# Patient Record
Sex: MALE | Race: WHITE | Employment: OTHER | URBAN - METROPOLITAN AREA
[De-identification: names, ages, dates, MRNs, and addresses within clinical notes are randomized per-mention and may not be internally consistent; named-entity substitution may affect disease eponyms.]

---

## 2018-07-17 ENCOUNTER — APPOINTMENT (OUTPATIENT)
Dept: ULTRASOUND IMAGING | Age: 78
DRG: 564 | End: 2018-07-17
Payer: MEDICARE

## 2018-07-17 ENCOUNTER — APPOINTMENT (OUTPATIENT)
Dept: GENERAL RADIOLOGY | Age: 78
DRG: 564 | End: 2018-07-17
Payer: MEDICARE

## 2018-07-17 ENCOUNTER — APPOINTMENT (OUTPATIENT)
Dept: CT IMAGING | Age: 78
DRG: 564 | End: 2018-07-17
Payer: MEDICARE

## 2018-07-17 ENCOUNTER — HOSPITAL ENCOUNTER (INPATIENT)
Age: 78
LOS: 6 days | Discharge: NURSING, REHAB OR LONG-TERM CARE | DRG: 564 | End: 2018-07-23
Attending: EMERGENCY MEDICINE | Admitting: INTERNAL MEDICINE
Payer: MEDICARE

## 2018-07-17 DIAGNOSIS — R94.31 PROLONGED Q-T INTERVAL ON ECG: ICD-10-CM
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
## 2018-07-17 DIAGNOSIS — T79.6XXA TRAUMATIC RHABDOMYOLYSIS, INITIAL ENCOUNTER (HCC): Primary | ICD-10-CM

## 2018-07-17 DIAGNOSIS — R41.82 ALTERED MENTAL STATUS, UNSPECIFIED ALTERED MENTAL STATUS TYPE: ICD-10-CM

## 2018-07-17 DIAGNOSIS — R55 SYNCOPE AND COLLAPSE: ICD-10-CM

## 2018-07-17 LAB
ACETAMINOPHEN LEVEL: <5 MCG/ML (ref 10–30)
ALBUMIN SERPL-MCNC: 3.3 G/DL (ref 3.5–5.2)
ALP BLD-CCNC: 87 U/L (ref 40–129)
ALT SERPL-CCNC: 29 U/L (ref 0–40)
AMPHETAMINE SCREEN, URINE: NOT DETECTED
ANION GAP SERPL CALCULATED.3IONS-SCNC: 12 MMOL/L (ref 7–16)
AST SERPL-CCNC: 59 U/L (ref 0–39)
B.E.: 0.2 MMOL/L (ref -3–3)
BACTERIA: NORMAL /HPF
BARBITURATE SCREEN URINE: NOT DETECTED
BASOPHILS ABSOLUTE: 0.01 E9/L (ref 0–0.2)
BASOPHILS RELATIVE PERCENT: 0.1 % (ref 0–2)
BENZODIAZEPINE SCREEN, URINE: NOT DETECTED
BILIRUB SERPL-MCNC: 0.7 MG/DL (ref 0–1.2)
BILIRUBIN URINE: ABNORMAL
BLOOD, URINE: ABNORMAL
BUN BLDV-MCNC: 24 MG/DL (ref 8–23)
CALCIUM SERPL-MCNC: 8.7 MG/DL (ref 8.6–10.2)
CANNABINOID SCREEN URINE: NOT DETECTED
CHLORIDE BLD-SCNC: 107 MMOL/L (ref 98–107)
CHP ED QC CHECK: NORMAL
CK MB: 13.5 NG/ML (ref 0–7.7)
CK MB: 17.8 NG/ML (ref 0–7.7)
CLARITY: CLEAR
CO2: 25 MMOL/L (ref 22–29)
COCAINE METABOLITE SCREEN URINE: NOT DETECTED
COHB: 0.8 % (ref 0–1.5)
COLOR: YELLOW
COMMENT: ABNORMAL
CREAT SERPL-MCNC: 1.1 MG/DL (ref 0.7–1.2)
CRITICAL: ABNORMAL
DATE ANALYZED: ABNORMAL
DATE OF COLLECTION: ABNORMAL
EOSINOPHILS ABSOLUTE: 0 E9/L (ref 0.05–0.5)
EOSINOPHILS RELATIVE PERCENT: 0 % (ref 0–6)
ETHANOL: <10 MG/DL (ref 0–0.08)
GFR AFRICAN AMERICAN: >60
GFR NON-AFRICAN AMERICAN: >60 ML/MIN/1.73
GLUCOSE BLD-MCNC: 110 MG/DL
GLUCOSE BLD-MCNC: 116 MG/DL (ref 74–109)
GLUCOSE URINE: NEGATIVE MG/DL
HCO3: 27 MMOL/L (ref 22–26)
HCT VFR BLD CALC: 35.5 % (ref 37–54)
HEMOGLOBIN: 11.8 G/DL (ref 12.5–16.5)
HHB: 51.8 % (ref 0–5)
IMMATURE GRANULOCYTES #: 0.08 E9/L
IMMATURE GRANULOCYTES %: 0.7 % (ref 0–5)
KETONES, URINE: 15 MG/DL
LAB: ABNORMAL
LACTIC ACID: 1.2 MMOL/L (ref 0.5–2.2)
LEUKOCYTE ESTERASE, URINE: NEGATIVE
LYMPHOCYTES ABSOLUTE: 0.64 E9/L (ref 1.5–4)
LYMPHOCYTES RELATIVE PERCENT: 5.8 % (ref 20–42)
Lab: 1045
MAGNESIUM: 2.3 MG/DL (ref 1.6–2.6)
MCH RBC QN AUTO: 30.6 PG (ref 26–35)
MCHC RBC AUTO-ENTMCNC: 33.2 % (ref 32–34.5)
MCV RBC AUTO: 92.2 FL (ref 80–99.9)
METER GLUCOSE: 110 MG/DL (ref 70–110)
METER GLUCOSE: 93 MG/DL (ref 70–110)
METHADONE SCREEN, URINE: NOT DETECTED
METHB: 0.5 % (ref 0–1.5)
MODE: ABNORMAL
MONOCYTES ABSOLUTE: 0.72 E9/L (ref 0.1–0.95)
MONOCYTES RELATIVE PERCENT: 6.5 % (ref 2–12)
NEUTROPHILS ABSOLUTE: 9.65 E9/L (ref 1.8–7.3)
NEUTROPHILS RELATIVE PERCENT: 86.9 % (ref 43–80)
NITRITE, URINE: NEGATIVE
O2 CONTENT: 7.9 ML/DL
O2 SATURATION: 47.5 % (ref 92–98.5)
O2HB: 46.9 % (ref 94–97)
OPERATOR ID: 1991
OPIATE SCREEN URINE: NOT DETECTED
PATIENT TEMP: 37 C
PCO2: 53.4 MMHG (ref 35–45)
PDW BLD-RTO: 12.6 FL (ref 11.5–15)
PH BLOOD GAS: 7.32 (ref 7.35–7.45)
PH UA: 5 (ref 5–9)
PHENCYCLIDINE SCREEN URINE: NOT DETECTED
PLATELET # BLD: 271 E9/L (ref 130–450)
PMV BLD AUTO: 11.4 FL (ref 7–12)
PO2: 28.1 MMHG (ref 60–100)
POTASSIUM REFLEX MAGNESIUM: 4.5 MMOL/L (ref 3.5–5)
PROPOXYPHENE SCREEN: NOT DETECTED
PROTEIN UA: ABNORMAL MG/DL
RBC # BLD: 3.85 E12/L (ref 3.8–5.8)
RBC UA: NORMAL /HPF (ref 0–2)
SALICYLATE, SERUM: <0.3 MG/DL (ref 0–30)
SODIUM BLD-SCNC: 144 MMOL/L (ref 132–146)
SOURCE, BLOOD GAS: ABNORMAL
SPECIFIC GRAVITY UA: >=1.03 (ref 1–1.03)
THB: 12 G/DL (ref 11.5–16.5)
TIME ANALYZED: 1050
TOTAL CK: 1210 U/L (ref 20–200)
TOTAL CK: 1494 U/L (ref 20–200)
TOTAL CK: 1662 U/L (ref 20–200)
TOTAL PROTEIN: 7 G/DL (ref 6.4–8.3)
TRICYCLIC ANTIDEPRESSANTS SCREEN SERUM: NEGATIVE NG/ML
TROPONIN: 0.02 NG/ML (ref 0–0.03)
TROPONIN: 0.03 NG/ML (ref 0–0.03)
TROPONIN: 0.03 NG/ML (ref 0–0.03)
TSH SERPL DL<=0.05 MIU/L-ACNC: 2.08 UIU/ML (ref 0.27–4.2)
UROBILINOGEN, URINE: 1 E.U./DL
WBC # BLD: 11.1 E9/L (ref 4.5–11.5)
WBC UA: NORMAL /HPF (ref 0–5)

## 2018-07-17 PROCEDURE — G0480 DRUG TEST DEF 1-7 CLASSES: HCPCS

## 2018-07-17 PROCEDURE — 84443 ASSAY THYROID STIM HORMONE: CPT

## 2018-07-17 PROCEDURE — 2580000003 HC RX 258: Performed by: EMERGENCY MEDICINE

## 2018-07-17 PROCEDURE — 1200000000 HC SEMI PRIVATE

## 2018-07-17 PROCEDURE — 82553 CREATINE MB FRACTION: CPT

## 2018-07-17 PROCEDURE — 81001 URINALYSIS AUTO W/SCOPE: CPT

## 2018-07-17 PROCEDURE — 72125 CT NECK SPINE W/O DYE: CPT

## 2018-07-17 PROCEDURE — 86038 ANTINUCLEAR ANTIBODIES: CPT

## 2018-07-17 PROCEDURE — 71045 X-RAY EXAM CHEST 1 VIEW: CPT

## 2018-07-17 PROCEDURE — 99285 EMERGENCY DEPT VISIT HI MDM: CPT

## 2018-07-17 PROCEDURE — 70450 CT HEAD/BRAIN W/O DYE: CPT

## 2018-07-17 PROCEDURE — 36415 COLL VENOUS BLD VENIPUNCTURE: CPT

## 2018-07-17 PROCEDURE — 82805 BLOOD GASES W/O2 SATURATION: CPT

## 2018-07-17 PROCEDURE — 73521 X-RAY EXAM HIPS BI 2 VIEWS: CPT

## 2018-07-17 PROCEDURE — 2580000003 HC RX 258: Performed by: INTERNAL MEDICINE

## 2018-07-17 PROCEDURE — 93880 EXTRACRANIAL BILAT STUDY: CPT

## 2018-07-17 PROCEDURE — 84484 ASSAY OF TROPONIN QUANT: CPT

## 2018-07-17 PROCEDURE — 82085 ASSAY OF ALDOLASE: CPT

## 2018-07-17 PROCEDURE — 80307 DRUG TEST PRSMV CHEM ANLYZR: CPT

## 2018-07-17 PROCEDURE — 85025 COMPLETE CBC W/AUTO DIFF WBC: CPT

## 2018-07-17 PROCEDURE — 87088 URINE BACTERIA CULTURE: CPT

## 2018-07-17 PROCEDURE — 83605 ASSAY OF LACTIC ACID: CPT

## 2018-07-17 PROCEDURE — 6360000002 HC RX W HCPCS: Performed by: INTERNAL MEDICINE

## 2018-07-17 PROCEDURE — 82962 GLUCOSE BLOOD TEST: CPT

## 2018-07-17 PROCEDURE — 82550 ASSAY OF CK (CPK): CPT

## 2018-07-17 PROCEDURE — 80053 COMPREHEN METABOLIC PANEL: CPT

## 2018-07-17 PROCEDURE — 83735 ASSAY OF MAGNESIUM: CPT

## 2018-07-17 RX ORDER — SODIUM CHLORIDE 0.9 % (FLUSH) 0.9 %
10 SYRINGE (ML) INJECTION EVERY 12 HOURS SCHEDULED
Status: DISCONTINUED | OUTPATIENT
Start: 2018-07-17 | End: 2018-07-23 | Stop reason: HOSPADM

## 2018-07-17 RX ORDER — ONDANSETRON 2 MG/ML
4 INJECTION INTRAMUSCULAR; INTRAVENOUS EVERY 6 HOURS PRN
Status: DISCONTINUED | OUTPATIENT
Start: 2018-07-17 | End: 2018-07-23 | Stop reason: HOSPADM

## 2018-07-17 RX ORDER — ACETAMINOPHEN 325 MG/1
650 TABLET ORAL EVERY 4 HOURS PRN
Status: DISCONTINUED | OUTPATIENT
Start: 2018-07-17 | End: 2018-07-23 | Stop reason: HOSPADM

## 2018-07-17 RX ORDER — SODIUM CHLORIDE 9 MG/ML
INJECTION, SOLUTION INTRAVENOUS CONTINUOUS
Status: DISCONTINUED | OUTPATIENT
Start: 2018-07-17 | End: 2018-07-21

## 2018-07-17 RX ORDER — SODIUM CHLORIDE 0.9 % (FLUSH) 0.9 %
10 SYRINGE (ML) INJECTION PRN
Status: DISCONTINUED | OUTPATIENT
Start: 2018-07-17 | End: 2018-07-17 | Stop reason: SDUPTHER

## 2018-07-17 RX ORDER — SODIUM CHLORIDE 0.9 % (FLUSH) 0.9 %
10 SYRINGE (ML) INJECTION EVERY 12 HOURS SCHEDULED
Status: DISCONTINUED | OUTPATIENT
Start: 2018-07-17 | End: 2018-07-17 | Stop reason: SDUPTHER

## 2018-07-17 RX ORDER — 0.9 % SODIUM CHLORIDE 0.9 %
1000 INTRAVENOUS SOLUTION INTRAVENOUS ONCE
Status: COMPLETED | OUTPATIENT
Start: 2018-07-17 | End: 2018-07-17

## 2018-07-17 RX ORDER — SODIUM CHLORIDE 0.9 % (FLUSH) 0.9 %
10 SYRINGE (ML) INJECTION PRN
Status: DISCONTINUED | OUTPATIENT
Start: 2018-07-17 | End: 2018-07-23 | Stop reason: HOSPADM

## 2018-07-17 RX ADMIN — SODIUM CHLORIDE: 9 INJECTION, SOLUTION INTRAVENOUS at 22:02

## 2018-07-17 RX ADMIN — ENOXAPARIN SODIUM 40 MG: 40 INJECTION, SOLUTION INTRAVENOUS; SUBCUTANEOUS at 18:30

## 2018-07-17 RX ADMIN — SODIUM CHLORIDE: 9 INJECTION, SOLUTION INTRAVENOUS at 12:11

## 2018-07-17 RX ADMIN — Medication 10 ML: at 21:00

## 2018-07-17 RX ADMIN — SODIUM CHLORIDE 1000 ML: 9 INJECTION, SOLUTION INTRAVENOUS at 08:35

## 2018-07-17 ASSESSMENT — ENCOUNTER SYMPTOMS
EYE REDNESS: 0
SHORTNESS OF BREATH: 0
NAUSEA: 0
ABDOMINAL PAIN: 0
VOMITING: 0

## 2018-07-17 NOTE — ED NOTES
Bed: 10  Expected date:   Expected time:   Means of arrival:   Comments:     Janet Phillips RN  07/17/18 7890

## 2018-07-17 NOTE — ED PROVIDER NOTES
Chief complaint: Decreased appetite and fatigue    HPI    The patient is a 79-year-old male presenting to the emergency department the chief complaint of decreased appetite and fatigue. The patient is a somewhat poor historian. The history is obtained per EMS from the patient's family. Patient's family is not available for further history after the patient's arrival in the emergency department. Per the family the patient has been not eating or drinking over the last 2 weeks. He has had very little since July 4. The report was initially saw the patient was 11 PM last night whenever mother with the ventricles. The patient was found this morning on the floor. The patient was conscious when he was on the floor. The patient does not recall how he got on the floor. He denies any pain at the current time. Per EMS the patient's house was in the floor living conditions. The patient is not able to offer any further history secondary to being a very poor historian. The patient did receive 500 mL of fluid by EMS. Review of Systems   Constitutional: Positive for activity change and appetite change. Negative for fever. Eyes: Negative for redness. Respiratory: Negative for shortness of breath. Cardiovascular: Negative for chest pain. Gastrointestinal: Negative for abdominal pain, nausea and vomiting. Genitourinary: Negative for hematuria. Musculoskeletal: Positive for arthralgias. Skin: Negative for rash. Neurological: Negative for light-headedness. Psychiatric/Behavioral: Positive for confusion. Physical Exam   Constitutional: He appears cachectic. Non-toxic appearance. He has a sickly appearance. He appears ill. No distress. The patient is sitting in the bed and is a very sickly and ill appearing individual. He is in no acute distress. HENT:   Head: Normocephalic and atraumatic.    Right Ear: Hearing and external ear normal.   Left Ear: Hearing and external ear normal.   Nose: Nose normal. E9/L    Immature Granulocytes # 0.08 E9/L    Lymphocytes # 0.64 (L) 1.50 - 4.00 E9/L    Monocytes # 0.72 0.10 - 0.95 E9/L    Eosinophils # 0.00 (L) 0.05 - 0.50 E9/L    Basophils # 0.01 0.00 - 0.20 E9/L   Comprehensive Metabolic Panel w/ Reflex to MG   Result Value Ref Range    Sodium 144 132 - 146 mmol/L    Potassium reflex Magnesium 4.5 3.5 - 5.0 mmol/L    Chloride 107 98 - 107 mmol/L    CO2 25 22 - 29 mmol/L    Anion Gap 12 7 - 16 mmol/L    Glucose 116 (H) 74 - 109 mg/dL    BUN 24 (H) 8 - 23 mg/dL    CREATININE 1.1 0.7 - 1.2 mg/dL    GFR Non-African American >60 >=60 mL/min/1.73    GFR African American >60     Calcium 8.7 8.6 - 10.2 mg/dL    Total Protein 7.0 6.4 - 8.3 g/dL    Alb 3.3 (L) 3.5 - 5.2 g/dL    Total Bilirubin 0.7 0.0 - 1.2 mg/dL    Alkaline Phosphatase 87 40 - 129 U/L    ALT 29 0 - 40 U/L    AST 59 (H) 0 - 39 U/L   Troponin   Result Value Ref Range    Troponin 0.03 0.00 - 0.03 ng/mL   Urinalysis   Result Value Ref Range    Color, UA Yellow Straw/Yellow    Clarity, UA Clear Clear    Glucose, Ur Negative Negative mg/dL    Bilirubin Urine SMALL (A) Negative    Ketones, Urine 15 (A) Negative mg/dL    Specific Gravity, UA >=1.030 1.005 - 1.030    Blood, Urine MODERATE (A) Negative    pH, UA 5.0 5.0 - 9.0    Protein, UA TRACE Negative mg/dL    Urobilinogen, Urine 1.0 <2.0 E.U./dL    Nitrite, Urine Negative Negative    Leukocyte Esterase, Urine Negative Negative   CK   Result Value Ref Range    Total CK 1,662 (H) 20 - 200 U/L   Lactic Acid, Plasma   Result Value Ref Range    Lactic Acid 1.2 0.5 - 2.2 mmol/L   Microscopic Urinalysis   Result Value Ref Range    WBC, UA 0-1 0 - 5 /HPF    RBC, UA NONE 0 - 2 /HPF    Bacteria, UA NONE /HPF   Serum Drug Screen   Result Value Ref Range    Ethanol Lvl <10 mg/dL    Acetaminophen Level <5.0 (L) 10.0 - 96.0 mcg/mL    Salicylate, Serum <2.1 0.0 - 30.0 mg/dL   Urine Drug Screen   Result Value Ref Range    Amphetamine Screen, Urine NOT DETECTED Negative <1000 ng/mL < 300ng/mL    PCP Screen, Urine NOT DETECTED Negative < 25 ng/mL    Methadone Screen, Urine NOT DETECTED Negative <300 ng/mL    Propoxyphene Scrn, Ur NOT DETECTED Negative <300 ng/mL   Blood Gas, Arterial   Result Value Ref Range    Date Analyzed [id number]     Time Analyzed 1050     Source: Blood Arterial     pH, Blood Gas 7.321 (L) 7.350 - 7.450    PCO2 53.4 (H) 35.0 - 45.0 mmHg    PO2 28.1 (LL) 60.0 - 100.0 mmHg    HCO3 27.0 (H) 22.0 - 26.0 mmol/L    B.E. 0.2 -3.0 - 3.0 mmol/L    O2 Sat 47.5 (L) 92.0 - 98.5 %    O2Hb 46.9 (L) 94.0 - 97.0 %    COHb 0.8 0.0 - 1.5 %    MetHb 0.5 0.0 - 1.5 %    O2 Content 7.9 mL/dL    HHb 51.8 (H) 0.0 - 5.0 %    tHb (est) 12.0 11.5 - 16.5 g/dL    Mode RA     Comment       with read back - SAMPLE WAS DETERMINED TO BE VENOUS    Date Of Collection [id number]     Time Collected 1045     Pt Temp 37.0 C     ID 1991     Lab [id number]     Critical(s) Notified Called to KATIE Boo RN    POCT Glucose   Result Value Ref Range    Glucose 110 mg/dL    QC OK? ok    POCT Glucose   Result Value Ref Range    Meter Glucose 110 70 - 110 mg/dL   EKG 12 lead   Result Value Ref Range    Ventricular Rate 51 BPM    Atrial Rate 51 BPM    P-R Interval 132 ms    QRS Duration 92 ms    Q-T Interval 544 ms    QTc Calculation (Bazett) 501 ms    P Axis 43 degrees    R Axis 69 degrees    T Axis 51 degrees       RADIOLOGY:  XR HIP BILATERAL W AP PELVIS (2 VIEWS)   Final Result   No fracture. CT Cervical Spine WO Contrast   Final Result   1. No fracture. 2. Multilevel spondylosis. CT Head WO Contrast   Final Result   No CT evidence of acute intracranial injury. XR CHEST PORTABLE   Final Result   No acute cardiopulmonary disease. EKG: This EKG is signed and interpreted by me.     Rate: 51  Rhythm: Sinus bradycardia with PACs  Interpretation: Sinus bradycardia with PACs, prolonged QT interval  Comparison: changes compared to previous EKG      ------------------------- NURSING NOTES AND VITALS REVIEWED ---------------------------  Date / Time Roomed:  7/17/2018  7:21 AM  ED Bed Assignment:  10/10    The nursing notes within the ED encounter and vital signs as below have been reviewed. Patient Vitals for the past 24 hrs:   BP Temp Temp src Pulse Resp SpO2 Height Weight   07/17/18 1316 (!) 164/64 97.5 °F (36.4 °C) Oral 58 16 99 % - -   07/17/18 1018 (!) 168/69 96.8 °F (36 °C) Axillary 52 16 100 % - -   07/17/18 0853 (!) 139/55 - - (!) 45 15 99 % - -   07/17/18 0759 (!) 158/65 96.6 °F (35.9 °C) Axillary 58 20 99 % 6' (1.829 m) 140 lb (63.5 kg)       Oxygen Saturation Interpretation: Normal    ------------------------------------------ PROGRESS NOTES ------------------------------------------  Re-evaluation(s):  See ED course    Counseling:  I have spoken with the patient and discussed todays results, in addition to providing specific details for the plan of care and counseling regarding the diagnosis and prognosis. Their questions are answered at this time and they are agreeable with the plan of admission.    --------------------------------- ADDITIONAL PROVIDER NOTES ---------------------------------  Consultations:  Time: [id number] with Dr. Gerry Garduno  Discussed case. They will admit the patient. This patient's ED course included: a personal history and physicial examination, re-evaluation prior to disposition, multiple bedside re-evaluations, IV medications, cardiac monitoring and continuous pulse oximetry    This patient has remained hemodynamically stable during their ED course. Diagnosis:  1. Traumatic rhabdomyolysis, initial encounter ([facility] [address]    2. Altered mental status, unspecified altered mental status type    3. Syncope and collapse    4. Fall from standing, initial encounter    5. Prolonged Q-T interval on ECG        Disposition:  Patient's disposition: Admit to telemetry  Patient's condition is stable.              Boris Evans DO  Resident  07/17/18 8769

## 2018-07-17 NOTE — H&P
Social History    Marital status:      Spouse name: N/A    Number of children: N/A    Years of education: N/A     Occupational History    Not on file. Social History Main Topics    Smoking status: Former Smoker    Smokeless tobacco: Never Used    Alcohol use No    Drug use: No    Sexual activity: Not on file     Other Topics Concern    Not on file     Social History Narrative    No narrative on file     Prior to Admission medications    Not on File     Allergies: Ativan [lorazepam]    Review of Systems  All bolded are positive; please see HPI  General:  Fever, chills, diaphoresis, fatigue, malaise, night sweats, weight loss, confusion  Psychological:  Anxiety, disorientation, hallucinations. ENT:  Epistaxis, vertigo, visual changes. Cardiovascular:  Chest pain, irregular heartbeats, palpitations, paroxysmal nocturnal dyspnea. Respiratory:  Shortness of breath, coughing, sputum production, hemoptysis, wheezing, orthopnea.   Gastrointestinal:  Nausea, vomiting, diarrhea, heartburn, constipation, abdominal pain, hematemesis, hematochezia, melena, acholic stools  Genito-Urinary:  Dysuria, urgency, frequency, hematuria  Musculoskeletal:  Joint pain, joint stiffness, joint swelling, muscle pain  Neurology:  Headache, focal neurological deficits, weakness, numbness, paresthesia  Derm:  Rashes, ulcers, excoriations, bruising  Extremities:  Decreased ROM, peripheral edema, mottling    Physical Examination  /78   Pulse (!) 49   Temp 97.7 °F (36.5 °C) (Oral)   Resp 16   Ht 6' (1.829 m)   Wt 140 lb (63.5 kg)   SpO2 99%   BMI 18.99 kg/m²   General: patient is awake, alert, and oriented to self and place; cachectic and ill-appearing; they are in no apparent distress and do not exhibit any labored breathing at this time  HEENT: Dry mucous membranes, poor dentition  Neck: supple with trachea midline and without obvious JVD or bruit  Cardiac: regular rate and rhythm without obvious murmur, rub, or gallop  Lungs: Diminished breath sounds bilaterally without wheezes, rhonchi, or rales  Abdomen: soft, nontender, and nondistended with normal active bowel sounds and without organomegaly  Extremities: atraumatic, without ulcers or edema   Neurologic: mental status is as above, cranial nerves are grossly intact, the patient moves all extremities spontaneously, and no focal deficits are appreciated on exam    Recent vitals, labs, and imaging results have been reviewed and are available in the electronic medical record. Assessment and Plan  Patient is a 78 y.o. male who presented with confusion. The active problem list is as follows:    Chief Complaint   Patient presents with    Other     pt to er via squad from home c/o of not eating since july 4th [facility] state pt was on floor on their arrival  had been there for some time, pt on arrival to er is awake answers to name know he is at [facility] does not know year thought was 2017     Rhabdomyolysis  Acute confusion, unknown origin  Sinus bradycardia  Malnutrition  Anemia    Patient has been admitted to telemetry. IV fluid resuscitation will be initiated. CT head was performed in the ED and was unremarkable. MRI of the brain will be ordered. Cardiac enzymes will be trended. CK was elevated indicating rhabdomyolysis which will be trended. Patient will undergo work up for syncope, generalized weakness, acute confusion, and recurrent falls. Routine lab work in the morning. · DVT prophylaxis with lovenox. · The case and plan of care were discussed with Dr. Smita Galo. Linda Duval DO PGY2  7/17/2018  5:03 PM     I  discussed the patient's management with the resident. I reviewed the resident's note and agree with the documented findings and plan of care.     Lissette Salinas D.O., FACOI  5:30 PM  7/17/2018

## 2018-07-17 NOTE — ED PROVIDER NOTES
ED Attending Note:  I have personally performed and/or participated in the history, exam, medical decision making, and procedures and agree with all pertinent clinical information unless otherwise noted. I have also reviewed and agree with the past medical, family and social history unless otherwise noted. I have discussed this patient in detail with the resident/mlp, and provided the instruction and education regarding care and treatment. My findings/plan:     Subjective:  Patient is a 22-year-old male who lives at home with his family. He was found on the floor this morning. The patient is a poor historian and can't tell me when he fell on the floor. He complains of pain in his back and left hip. He denies any loss of consciousness. The patient is a poor historian. Objective:  Vitals:    07/17/18 0759   BP: (!) 158/65   Pulse: 58   Resp: 20   Temp: 96.6 °F (35.9 °C)   SpO2: 99%     Patient is awake alert and oriented to person only  Head is atraumatic normocephalic  Neck supple nontender palpation  Lungs sounds are clear and equal   Heart tones are regular at about 60 bpm without murmur rub or gallop  Chest wall is nontender to palpation  Back shows mild left paraspinal tenderness with bruising noted  Patient has left hip pain to palpation  No focal neuro deficit      Assessment:  Fall   Multiple contusions   Generalized weakness   Confusion   Rule out rhabdomyolysis     Plan:  ED record   Cristhian Coyle MD  07/17/18 7126

## 2018-07-18 ENCOUNTER — APPOINTMENT (OUTPATIENT)
Dept: MRI IMAGING | Age: 78
DRG: 564 | End: 2018-07-18
Payer: MEDICARE

## 2018-07-18 PROBLEM — M21.331 RIGHT WRIST DROP: Status: ACTIVE | Noted: 2018-07-18

## 2018-07-18 LAB
ALBUMIN SERPL-MCNC: 2.7 G/DL (ref 3.5–5.2)
ALP BLD-CCNC: 72 U/L (ref 40–129)
ALT SERPL-CCNC: 22 U/L (ref 0–40)
ANION GAP SERPL CALCULATED.3IONS-SCNC: 9 MMOL/L (ref 7–16)
AST SERPL-CCNC: 43 U/L (ref 0–39)
BILIRUB SERPL-MCNC: 0.6 MG/DL (ref 0–1.2)
BUN BLDV-MCNC: 18 MG/DL (ref 8–23)
CALCIUM SERPL-MCNC: 7.8 MG/DL (ref 8.6–10.2)
CHLORIDE BLD-SCNC: 110 MMOL/L (ref 98–107)
CHOLESTEROL, TOTAL: 97 MG/DL (ref 0–199)
CO2: 25 MMOL/L (ref 22–29)
CREAT SERPL-MCNC: 0.9 MG/DL (ref 0.7–1.2)
FOLATE: 18.5 NG/ML (ref 4.8–24.2)
GFR AFRICAN AMERICAN: >60
GFR NON-AFRICAN AMERICAN: >60 ML/MIN/1.73
GLUCOSE BLD-MCNC: 72 MG/DL (ref 74–109)
HBA1C MFR BLD: 5.5 % (ref 4–5.6)
HCT VFR BLD CALC: 31.3 % (ref 37–54)
HDLC SERPL-MCNC: 45 MG/DL
HEMOGLOBIN: 10.3 G/DL (ref 12.5–16.5)
LDL CHOLESTEROL CALCULATED: 38 MG/DL (ref 0–99)
MAGNESIUM: 2.2 MG/DL (ref 1.6–2.6)
MCH RBC QN AUTO: 31 PG (ref 26–35)
MCHC RBC AUTO-ENTMCNC: 32.9 % (ref 32–34.5)
MCV RBC AUTO: 94.3 FL (ref 80–99.9)
PDW BLD-RTO: 12.9 FL (ref 11.5–15)
PHOSPHORUS: 2.2 MG/DL (ref 2.5–4.5)
PLATELET # BLD: 210 E9/L (ref 130–450)
PMV BLD AUTO: 11.7 FL (ref 7–12)
POTASSIUM SERPL-SCNC: 3.8 MMOL/L (ref 3.5–5)
RBC # BLD: 3.32 E12/L (ref 3.8–5.8)
SEDIMENTATION RATE, ERYTHROCYTE: 27 MM/HR (ref 0–15)
SODIUM BLD-SCNC: 144 MMOL/L (ref 132–146)
TOTAL PROTEIN: 5.9 G/DL (ref 6.4–8.3)
TRIGL SERPL-MCNC: 69 MG/DL (ref 0–149)
VITAMIN B-12: 920 PG/ML (ref 211–946)
VLDLC SERPL CALC-MCNC: 14 MG/DL
WBC # BLD: 7.1 E9/L (ref 4.5–11.5)

## 2018-07-18 PROCEDURE — 82607 VITAMIN B-12: CPT

## 2018-07-18 PROCEDURE — 82746 ASSAY OF FOLIC ACID SERUM: CPT

## 2018-07-18 PROCEDURE — 97161 PT EVAL LOW COMPLEX 20 MIN: CPT | Performed by: PHYSICAL THERAPIST

## 2018-07-18 PROCEDURE — 97116 GAIT TRAINING THERAPY: CPT

## 2018-07-18 PROCEDURE — 97166 OT EVAL MOD COMPLEX 45 MIN: CPT

## 2018-07-18 PROCEDURE — 97116 GAIT TRAINING THERAPY: CPT | Performed by: PHYSICAL THERAPIST

## 2018-07-18 PROCEDURE — 85027 COMPLETE CBC AUTOMATED: CPT

## 2018-07-18 PROCEDURE — 6360000002 HC RX W HCPCS: Performed by: EMERGENCY MEDICINE

## 2018-07-18 PROCEDURE — 92523 SPEECH SOUND LANG COMPREHEN: CPT | Performed by: SPEECH-LANGUAGE PATHOLOGIST

## 2018-07-18 PROCEDURE — 92610 EVALUATE SWALLOWING FUNCTION: CPT | Performed by: SPEECH-LANGUAGE PATHOLOGIST

## 2018-07-18 PROCEDURE — 83036 HEMOGLOBIN GLYCOSYLATED A1C: CPT

## 2018-07-18 PROCEDURE — G8988 SELF CARE GOAL STATUS: HCPCS

## 2018-07-18 PROCEDURE — 84100 ASSAY OF PHOSPHORUS: CPT

## 2018-07-18 PROCEDURE — 6370000000 HC RX 637 (ALT 250 FOR IP): Performed by: INTERNAL MEDICINE

## 2018-07-18 PROCEDURE — 36415 COLL VENOUS BLD VENIPUNCTURE: CPT

## 2018-07-18 PROCEDURE — G8978 MOBILITY CURRENT STATUS: HCPCS | Performed by: PHYSICAL THERAPIST

## 2018-07-18 PROCEDURE — 97530 THERAPEUTIC ACTIVITIES: CPT | Performed by: PHYSICAL THERAPIST

## 2018-07-18 PROCEDURE — 80053 COMPREHEN METABOLIC PANEL: CPT

## 2018-07-18 PROCEDURE — 97110 THERAPEUTIC EXERCISES: CPT

## 2018-07-18 PROCEDURE — 92526 ORAL FUNCTION THERAPY: CPT | Performed by: SPEECH-LANGUAGE PATHOLOGIST

## 2018-07-18 PROCEDURE — 83735 ASSAY OF MAGNESIUM: CPT

## 2018-07-18 PROCEDURE — 93005 ELECTROCARDIOGRAM TRACING: CPT | Performed by: INTERNAL MEDICINE

## 2018-07-18 PROCEDURE — 2580000003 HC RX 258: Performed by: INTERNAL MEDICINE

## 2018-07-18 PROCEDURE — 1200000000 HC SEMI PRIVATE

## 2018-07-18 PROCEDURE — 2580000003 HC RX 258: Performed by: EMERGENCY MEDICINE

## 2018-07-18 PROCEDURE — 97535 SELF CARE MNGMENT TRAINING: CPT

## 2018-07-18 PROCEDURE — 70551 MRI BRAIN STEM W/O DYE: CPT

## 2018-07-18 PROCEDURE — G8987 SELF CARE CURRENT STATUS: HCPCS

## 2018-07-18 PROCEDURE — G8979 MOBILITY GOAL STATUS: HCPCS | Performed by: PHYSICAL THERAPIST

## 2018-07-18 PROCEDURE — 85651 RBC SED RATE NONAUTOMATED: CPT

## 2018-07-18 PROCEDURE — 80061 LIPID PANEL: CPT

## 2018-07-18 PROCEDURE — 97530 THERAPEUTIC ACTIVITIES: CPT

## 2018-07-18 RX ORDER — FOLIC ACID 1 MG/1
1 TABLET ORAL DAILY
Status: DISCONTINUED | OUTPATIENT
Start: 2018-07-18 | End: 2018-07-23 | Stop reason: HOSPADM

## 2018-07-18 RX ORDER — THIAMINE MONONITRATE (VIT B1) 100 MG
100 TABLET ORAL DAILY
Status: DISCONTINUED | OUTPATIENT
Start: 2018-07-18 | End: 2018-07-23 | Stop reason: HOSPADM

## 2018-07-18 RX ADMIN — Medication 10 ML: at 21:07

## 2018-07-18 RX ADMIN — SODIUM CHLORIDE: 9 INJECTION, SOLUTION INTRAVENOUS at 21:07

## 2018-07-18 RX ADMIN — FOLIC ACID 1 MG: 1 TABLET ORAL at 12:35

## 2018-07-18 RX ADMIN — Medication 100 MG: at 12:35

## 2018-07-18 RX ADMIN — ENOXAPARIN SODIUM 40 MG: 40 INJECTION, SOLUTION INTRAVENOUS; SUBCUTANEOUS at 19:09

## 2018-07-18 NOTE — PROGRESS NOTES
Nutrition Needs):  · Estimated Daily Total Kcal: 0108-0160 kcal (MSJ REE= 1409 x 1.2-1.3)  · Estimated Daily Protein (g): 75-85 gm    Estimated Intake vs Estimated Needs: Intake Less Than Needs    Nutrition Risk Level: Moderate    Nutrition Interventions:   Continue current diet, Start ONS (Start (standard high carol oral supplement)  Ensure Enlive BID )  Continued Inpatient Monitoring, Education Not Indicated, Speech Therapy, Coordination of Care    Nutrition Evaluation:   · Evaluation: Goals set   · Goals: pt is to consume >75% of meals and ONS    · Monitoring: Meal Intake, Supplement Intake, Diet Tolerance, Skin Integrity, Wound Healing, Fluid Balance, Weight, Comparative Standards, Pertinent Labs, Chewing/Swallowing    See Adult Nutrition Doc Flowsheet for more detail.      Electronically signed by Angelita Gregg RD, LD on 7/18/18 at 4:02 PM    Contact Number: [phone]

## 2018-07-18 NOTE — PROGRESS NOTES
Patient going off unit for an MRI, consult unable to be completed at this time.  [facility]

## 2018-07-18 NOTE — PLAN OF CARE
Problem: Nutrition  Goal: Optimal nutrition therapy  Outcome: Ongoing  Nutrition Problem: Inadequate oral intake  Intervention: Food and/or Nutrient Delivery: Continue current diet, Start ONS (Start (standard high carol oral supplement)  Ensure Enlive BID )  Nutritional Goals: pt is to consume >75% of meals and ONS

## 2018-07-18 NOTE — PROGRESS NOTES
Physical Therapy    [id number]    PHYSICAL THERAPY INITIAL EVALUATION  Tentative placement recommendation: acute rehab  Equipment prescriptions needed:   none  Plan of care: Patient will be seen  daily for therapeutic exercise, functional retraining, endurance activities, balance exercises, family and patient education. AM-PAC Basic Mobility       AM-[facility] Mobility Inpatient   How much difficulty turning over in bed?: A Lot  How much difficulty sitting down on / standing up from a chair with arms?: A Lot  How much difficulty moving from lying on back to sitting on side of bed?: A Lot  How much help from another person moving to and from a bed to a chair?: A Lot  How much help from another person needed to walk in hospital room?: A Lot  How much help from another person for climbing 3-5 steps with a railing?: Total  AM-PAC Inpatient Mobility Raw Score : 11  AM-PAC Inpatient T-Scale Score : 33.86  Mobility Inpatient CMS 0-100% Score: 72.57  Mobility Inpatient CMS G-Code Modifier : CL    Order: evaluate and treat  Diagnosis:    1. Traumatic rhabdomyolysis, initial encounter ([facility] [address]    2. Altered mental status, unspecified altered mental status type    3. Syncope and collapse    4. Fall from standing, initial encounter    5. Prolonged Q-T interval on ECG    right wrist and finger extension deficit    Past medical history: No past medical history on file.;No past surgical history on file. The admitting diagnosis and active problem list as listed above have been reviewed prior to the initiation of this evaluation. Nursing cleared the patient for evaluation. Patient is agreeable to evaluation.     Precautions: falls  ,   Social history: Patient lives with family in a single family home with ? to enter        Equipment owned: ,  unknown  Mentation: alert, cooperative, oriented x person and place and follows directions,  One step however poor follow through with complex motor commands  Prior level of function: indep found on floor; time unknown  ROM: within functional limits   STRENGTH: impaired ; significant weakness noted right wrist extension and finger extension; decreased sensation  PAIN: (measured on a visual analog scale with 0=no pain and 10=excruciating pain) 3/10. FUNCTIONAL ASSESSMENT   Bed Mobility- Supine to sit-Moderate assistance       Scooting- Moderate assistance       Sit to supine- Moderate assistance     Patient able to dangle on edge of bed for 20 minutes with min assist d/t unsteadiness   Transfers-sit to stand- Moderate assistance     Gait:  Patient ambulated 15 feet using  hand held assist   With Moderate assistance d/t poor motor planning in tight space      Balance: sit-poor      stand poor    Edema: yes -    Endurance: fair       Treatment:evaluation;  Dangle, Right upper extremitiy exercises, pre gait and gait  Patient/family education:effects of immobilty      Rehab Potential: good  for baseline  Patients Goal: None stated   ASSESSMENT  Patient exhibits decreased rom, strength, endurance,  balance, coordination impairing functional mobility. Goals to be met in 3 days. Bed mobility-Minimal assists    Transfers-Minimal assists    Ambulation-Minimal assists   for 40 feet using  wheeled walker vs cane pending right hand/wrist      Increase strength in affected muscle groups by 1/3 grade  Increase balance to allow for improvement towards functional goals. Increase endurance to allow for improvement towards functional goals.

## 2018-07-18 NOTE — CONSULTS
Cardiology consult  Dr. Nacho Vasquez      Reason for Consult: Sinus bradycardia  Requesting Physician: Dr. Rick Campbell: Decreased appetite, weakness  History Obtained From: patient, non-family caregiver - nurse, electronic medical record  HISTORY OF PRESENT ILLNESS:   Patient is 78years old male without significant past medical history who was brought to the hospital by his family because he was found on the floor disoriented, patient was noted to be bradycardic, cardiology was consulted. As per his son patient has been doing well until around July 4, when he started having episodes of feeling weak, with multiple falls, decreased oral intake, no syncopal episodes, patient stated that he knew with the dizziness is coming, he usually hold on to something, very vague description of his dizziness, associated symptoms included that of generalized weakness, inability to dorsiflex his right wrist, patient and his family stated that he looks a lot better since he came to the hospital, denies any chest pain, no shortness of breath, no pedal edema, no PND, no orthopnea. No cardiac history  No past medical history on file. No past surgical history on file.       Current Facility-Administered Medications:     0.9 % sodium chloride infusion, , Intravenous, Continuous, Karissa Hardwick DO, Last Rate: 100 mL/hr at 07/17/18 1211    sodium chloride flush 0.9 % injection 10 mL, 10 mL, Intravenous, 2 times per day, Karin Fall Bisel, DO    sodium chloride flush 0.9 % injection 10 mL, 10 mL, Intravenous, PRN, Karin Fall Bisel, DO    acetaminophen (TYLENOL) tablet 650 mg, 650 mg, Oral, Q4H PRN, Karin Fall Bisel, DO    magnesium hydroxide (MILK OF MAGNESIA) 400 MG/5ML suspension 30 mL, 30 mL, Oral, Daily PRN, [place] Comings, DO    ondansetron [facility]) injection 4 mg, 4 mg, Intravenous, Q6H PRN, [place] Comings, DO    [START ON 7/18/2018] enoxaparin (LOVENOX) injection 40 mg, 40 mg, Subcutaneous, Daily, Arpita Singh, DO    Allergies as of 07/17/2018 - Review Complete 07/17/2018   Allergen Reaction Noted    Ativan [lorazepam] Other (See Comments) 08/06/2017       Social History     Social History    Marital status:      Spouse name: N/A    Number of children: N/A    Years of education: N/A     Occupational History    Not on file.      Social History Main Topics    Smoking status: Former Smoker    Smokeless tobacco: Never Used    Alcohol use No    Drug use: No    Sexual activity: Not on file     Other Topics Concern    Not on file     Social History Narrative    No narrative on file     Family medical history: No significant family history of early CAD    REVIEW OF SYSTEMS:     CONSTITUTIONAL:  negative for  fevers, chills, sweats , positive fatigue  EYES:  negative for  double vision, blurred vision and blind spots  HEENT:  negative for  tinnitus, earaches, nasal congestion and epistaxis  RESPIRATORY:  negative for  dry cough, cough with sputum, wheezing and hemoptysis  CARDIOVASCULAR: as per HPI  GASTROINTESTINAL:  Positive for decreased appetite, negative for nausea, vomiting, diarrhea, constipation, pruritus and jaundice  GENITOURINARY:  negative for frequency, dysuria, nocturia, urinary incontinence and hesitancy  HEMATOLOGIC/LYMPHATIC:  negative for easy bruising, bleeding, lymphadenopathy and petechiae  ALLERGIC/IMMUNOLOGIC:  negative for urticaria, hay fever and angioedema  ENDOCRINE:  negative for heat intolerance, cold intolerance, tremor, hair loss and diabetic symptoms including neither polyuria nor polydipsia nor blurred vision  MUSCULOSKELETAL:  negative for  myalgias, arthralgias, joint swelling, stiff joints and decreased range of motion  NEUROLOGICAL:  Positive generalized weakness and dizziness     PHYSICAL EXAM:   CONSTITUTIONAL:  awake, alert, cooperative, no apparent distress, and appears stated age  EYES:  lids and lashes normal and pupils equal, round and reactive to light, HCT 35.5 07/17/2018    MCV 92.2 07/17/2018    RDW 12.6 07/17/2018     07/17/2018     PT/INR:  No results found for: PTINR  PT/INR Warfarin:  No components found for: PTPATWAR, PTINRWAR  PTT:    Lab Results   Component Value Date    APTT 25.2 10/08/2010     PTT Heparin:  No components found for: APTTHEP  Magnesium:    Lab Results   Component Value Date    MG 2.3 07/17/2018     TSH:    Lab Results   Component Value Date    TSH 2.080 07/17/2018     TROPONIN:  No components found for: TROP  BNP:  No results found for: BNP  FASTING LIPID PANEL:  No results found for: CHOL, HDL, TRIG  XR HIP BILATERAL W AP PELVIS (2 VIEWS)   Final Result   No fracture. CT Cervical Spine WO Contrast   Final Result   1. No fracture. 2. Multilevel spondylosis. CT Head WO Contrast   Final Result   No CT evidence of acute intracranial injury. XR CHEST PORTABLE   Final Result   No acute cardiopulmonary disease. MRI Brain WO Contrast    (Results Pending)   US CAROTID ARTERY BILATERAL    (Results Pending)     I have personally reviewed the laboratory, cardiac diagnostic and radiographic testing as outlined above:    A 78years old male was admitted to the hospital with altered mental status and weakness, was found to be bradycardic:  IMPRESSION:  1. Bradycardia: Episode of second-degree AV block, hemodynamically stable, very difficult to say if his weakness spells and his multiple falls over the last couple of weeks where related to bradycardia or not, will ambulate when he feels better, and she has heart rate response to exercise, if he continues to be significantly bradycardiac or noted to have chronotropic incompetence, will plan for pacemaker implant. 2. Prolonged QT interval: Secondary to bradycardia?, Starvation?, No significant hypokalemia or hypomagnesemia to explain it, Will repeat EKG in a.m.  3. Elevated troponin:  musculoskeletal in origin secondary to mild rhabdomyolysis  4.  Altered mental status

## 2018-07-18 NOTE — PROGRESS NOTES
Physical Therapy  Daily Treatment Note  7/18/2018  [id number]                      Linh Duarte   [id number]                              1940    Patient Active Problem List   Diagnosis    Syncope and collapse    Right wrist drop       Recommendation for discharge: acute rehab  Equipment prescriptions needed: none  AM-PAC Basic Mobility    Key: total(1); a lot (2); a little (3): none (4)  How much help from another person is needed. ..  3  1. Turning from your back to your side while in a flat bed without using bed rails?    3  2. Moving from lying on your back to sitting on the side of a flat bed w/o using bed rails? 2  3. Moving to and from a bed to a chair or wheelchair?     2  4. Standing up from a chair using your arms?    2  5. To walk in a hospital room?    2  6. Climbing 3-5 steps with a railing? Raw score:  11/24    Precautions: falls, alarm, R wrist drop, confusion      S: Patient cleared by nursing for treatment. Patient is agreeable to treatment. Pt confused throughout treatment. Pain status: (measured on a visual analog scale with 0=no pain and 10=excruciating pain) 0/10. O: Pt was instructed in and performed the following:   Bed Mobility- Supine to sit- Moderate assistance      Scooting- Moderate assistance     Sit to supine-Moderate assistance   Transfers-sit to stand- Moderate assistance with cues for correct hand placement      Gait: Patient ambulated 25 feet x 2 using Wheeled Walker with Moderate assistance. Comments: V/C for safety, increased JOSH, upright posture, walker management, and technique. Steps: NA  Treatment: Pt transferred to side of bed, sat up x 10 minutes with supervision, ambulated fwd/bkwd and to doorway. Comment: Pt RTB at end of treatment, alarm activated. Call light left by patient. A: Pt able to progress with increased ambulation distance and had no LOB but at risk of falls d/t narrow JOSH and shuffled steps.  Pt confused throughout and required

## 2018-07-18 NOTE — PROGRESS NOTES
head of bed  >  °               Saliva swallows: WNL          x   (serous and mucoidal secretions)           Impaired           Xerostomia             Consistencies Administered During the Evaluation:    Solids:  [x] Puree/pudding  Liquids: [x] Thin consistency      [] Soft solid     [] Nectar consistency      [x] Solid    [] Honey consistency    Method of Intake: [x] Teaspoon  [x] Cup  [x] Straw  Mealtime assessment?   no    [] Modified Blue Dye tracheostomy protocol utilized    RESULTS OF ASSESSMENT    Oral Stage:[] Normal  [] Functional  [x] Abnormal    [] Inadequate labial seal resulting anterior labial spillage from:     [] right     [] left     [] midline    [x] Decreased mastication due to:     [x] poor/missing dentition     [] decreased lingual control     [] vertical munch     [] other:    [] Delayed A-P transit due to:     [] decreased initiation     [] poor tongue movement     [] cognitive function   [] Oral residuals:        [] throughout oral cavity      [] anterior sulcus     [] left lateral sulcus       [] right lateral sulcus     [] other:       Comments:  Patient reported difficulty masticating tough meats and has had multiple teeth pulled. Pharyngeal Stage: [] Normal [] Functional [x] Abnormal   []  Although no OVERT clinical signs/symptoms of aspiration were present during this evaluation, silent aspiration cannot be definitively ruled out during a clinical swallow evalution. **If silent aspiration is suspected, a Modified Barium Swallow Study (MBSS) is recommended and requires a physician order. Salient observations:  [x]  OVERT clinical signs/symptoms of aspiration were present during this evaluation. A Modified Barium Swallow Study (MBSS) is recommended and requires a physician order.      SALIENT OBSERVATIONS  (check all that apply) Thin consistency liquids Nectar consistency liquids Honey consistency liquids Puree consistency   solids Solid consistency   Immediate re-directive throat clear        Latent re-directive throat clear x       Immediate re-directive cough        Latent re-directive cough x       Wet vocal quality          Wet respirations          Change in respiratory pattern        Skin flushing          Eye watering              [] Congested cough throughout evaluation   [] Absent swallow    Comments: Nursing aid reported coughing with thin liquids in am. Patient demonstrated latent throat clear/cough with thin liquid water and apple juice administered independently via cup or straw. Patient observed to take large, consecutive gulps and was hesitant to use cup sips rather than straw sips. Patient unable to use right (dominant) wrist/hand to assist with feeding. With utilization of modified blue dye tracheostomy protocol:  Upon suctioning, blue dye was:      [] present in secretions      [] absent from secretions   Comments:   EDUCATION/GOAL PLANNING  Education completed with:      [x] patient      [] family    Regarding:      [x] diagnosis      [x] treatment      [x] prognosis      [x] plan of care  Speech Pathologist (SLP) completed education with the patient/family regarding type of swallowing impairment. Reviewed current solid/liquid consistency diet recommendations and discussed compensatory strategies to ensure safe PO intake. Reviewed aspiration precautions. Encouraged pt and/or family to engage SLP in unstructured Q&A session relative to identified deficit areas. Patient/family may benefit from repeated education. [x] Patient was an active participant in goal planning process. [] Patient was unable to participate in goal planning process. [] Goal planning not appropriate at this time as intervention was not recommended. This plan will be re-evaluated and revised in  1  week   if warranted.    Prognosis for improvements is    [x] good          [] fair       [] guarded       [] poor  [x] The admitting diagnosis and active problem list as listed below have been reviewed prior to the initiation of this evaluation. Syncope and collapse [R55]     Patient Active Problem List   Diagnosis    Syncope and collapse     Malnutrition indicators have been identified?   no  Dietitian consulted? no     Beth Vargas  Speech Language Pathology Student    Tosha Sandoval MSCCC/SLP  Speech Language Pathologist  [phone]

## 2018-07-18 NOTE — PROGRESS NOTES
Conversational  speech   X--inconsistent responses      Identification  To be assessed  WNL Latent Impaired Cuing Unable N/A    Objects       X--patient unable to see; did not have his glasses     Pictures        X    Verbal  Directives  Continued assessment  WNL Latent Impaired Cuing Unable N/A    Simple  commands   X--inconsistent with motor tasks  X--unable to implement compensatory swallow strategies     Intermediate level   Commands      X     Complex  commands      X     Written  Directives   not assessed  WNL Latent Impaired Cuing Unable N/A    Simple   Commands          Intermediate level  Commands          Complex   commands           EXPRESSIVE LANGUAGE-  Expression          Intact      Impaired    Cuing      Unable    Spontaneous   speech  X--inconsistent responses      Serial   speech   Not assessed               Imitation tasks  To be assessed   Word level           Sentence level               Naming tasks  To be assessed   Confrontation naming           Functional description                   Response naming               Conversational   Speech tasks   Structured  X--inconsistent responses        Unstructured  X--inconsistent responses             Written   Expression   Not assessed   Structured          Unstructured         Comments: Patient provided inconsistent responses to most questions during conversational speech related to hospitalization and swallow. Patient unable to recognize items and verbalized inability to see without his glasses present. Patient was unable to follow commands related to compensatory strategies during oral intake to improve swallow function. Continued assessment of following directions and receptive/expressive language skills is to be completed.   Grammatical form:           [x] Intact    [] Impaired    [] Anomia present         [] Circumlocution present     [] Paraphasic errors present  COGNITION-   Attention/orientation  Attention:    [] Sustained attention         [x] Easily distracted   Neglect:      [] Absent         [] Left    [] Right  Orientation: continued assessment      [x] Person    [] Place    [] Date    [] Reason for hospitalization  Memory: To be assessed     Intact       Mild      deficit   Moderate     deficit     Marked       deficit   Immediate  STM recall              Recent   STM recall              Remote  LTM recall          Comments: Patient appeared to have confusion related to swallow ability and any associated difficulty during bedside exam. Due to reduced eval. time, will continue to assess. Organization,  Problem solving/  Reasoning      Functional        Impaired   Sequencing           Verbal task  X       Motor task  X   Problem Solving           Verbal task  X       Motor task  X    Mathematics  Not assessed       Simple  arithmetic      Functionally  based/IADL task         Comments: Organization/sequencing of expressive information did not always provide a clear message to the listener. Patient appeared to have difficulty sequencing motor tasks when presented with distractions, such as multiple staff members in the room. Patient did not consistently provide a clear message and was inconsistently able to clarify the message to the listener. Patient demonstrated difficulty feeding due to weakness in his dominant hand. Instead of trying to use his nondominant hand, he said he was unable to eat/drink independently. When instructed to use nondominant hand, patient was able to successfully drink from a cup.       Judgment/insight/safety:    [] Good    [x] Fair    [] Poor    [] Emerging   Salient clinical observations   [] Latent processing   [] Latent responses    [x] Inconsistent responses    [] Perseveration    [] Cueing necessary for accurate completion of task    [] Neglect present     [] Left     [] Right    [] Hemianopsia present     [] Left     [] Right             Education completed with:      [x] patient      [] family    EDUCATION/GOAL

## 2018-07-18 NOTE — PROGRESS NOTES
Internal Medicine Progress Note    Shanda Barajas. Melany Andrews., & [address] Melany Andrews., F.A.C.O.I. Brayden Mccray D.O., F.A.C.O.I. Primary Care Physician: No primary care provider on file. Admitting Physician:  Noreen Browning DO  Admission date and time: 7/17/2018  7:21 AM    Room:  [address] [place] [address]  Admitting diagnosis: Syncope and collapse [R55]    Patient Name: Jennifer Roberson  MRN: [id number]    Date of Service: 7/18/2018     Subjective:    [address] is a 78 y. o.  male who was seen and examined today,7/18/2018, at the bedside. Patient is a poor historian. Pleasantly confused. Oriented to self only at the present time. States that he cannot lift his right arm independently which is new. Admits to generalized aches and pains. No family present during my examination. I reviewed the patient's past medical, surgical history and medication. I reviewed the  course of events since last visit. Review of System:  Constitutional:   Negative for fever and chills. Fatigued. HEENT:   Negative for ear pain, sore throat, rhinorrhea and sinus pressure. Negative for eye pain, discharge and redness. Psch:   Denies depression or anxiety. Cardiovascular:   Denies any chest pain, irregular heartbeats, or palpitations. Respiratory:   Denies shortness of breath, coughing, sputum production, hemoptysis, or wheezing. Gastrointestinal:   Denies nausea, vomiting, diarrhea, or constipation. Denies any abdominal pain. Extremities:   Denies any lower extremity swelling or edema. Neurology:    Denies any headache or focal neurological deficits. Generalized weakness. Admits to inability to independently lift his right arm. Derm:   Denies any rashes, ulcers, or excoriations. Denies bruising. Genitourinary:    Denies any urgency, frequency, hematuria. Voiding without difficulty. Musculoskeletal:  Admits to generalized whole body aches and pains.       Allergy:  Allergies relatively preserved. No fracture. No fracture. Ct Head Wo Contrast    Result Date: 7/17/2018  Location:200 Exam: CT HEAD WO CONTRAST Comparison: 8/6/2017 History: Fall, altered mental status Findings: Noncontrast images were obtained through the brain parenchyma. Automated dose control. Ventricles are midline and nondilated. No acute abnormality of the brain parenchyma, intracranial hemorrhage, mass effect or large extra-axial fluid collection. Bifrontal encephalomalacia and cortical atrophy again noted. No CT evidence of acute intracranial injury. Ct Cervical Spine Wo Contrast    Result Date: 7/17/2018  Reading location: 200 INDICATION: Fall, altered mental status FINDINGS: Axial images obtained through the cervical spine. Computer reconstruction images in sagittal and coronal planes. Comparison with 10/8/2010. Automated dose control. Unremarkable prevertebral soft tissues. Intact cervical vertebral height. Multilevel disc and facet joint narrowing with spurring again noted. 6 mm anterolisthesis C6-7, cyst similar to prior. No fracture. 1. No fracture. 2. Multilevel spondylosis. Xr Chest Portable    Result Date: 7/17/2018  Location:200 Exam: XR CHEST PORTABLE Comparison: 8/7/2011 History: Fatigue Findings: Portable AP upright chest. Heart size is normal. Pulmonary vessels are nondistended. No focal pulmonary parenchymal consolidation. No acute cardiopulmonary disease. Mri Brain Wo Contrast    Result Date: 7/18/2018  LOCATION: 200 EXAM: MRI BRAIN WO CONTRAST COMPARISON: None HISTORY: Confusion TECHNIQUE: Standard multiplanar multisequence imaging of the brain was performed. CONTRAST: No contrast was administered. FINDINGS:  PARENCHYMA: Mild amount of confluent white matter signal abnormality is noted in the subcortical and periventricular white matter most notable in the frontal lobes. There is associated encephalomalacia seen involving bilateral frontal lobes.  VENTRICLES IN MIDLINE STRUCTURES: The midline structures appear normal. The ventricles, sulci and cisterns are normal in size and configuration. EXTRA-AXIAL SPACES: The extra-axial spaces appear normal with no mass lesions or fluid collections. ENHANCEMENT: Contrast was not administered. ISCHEMIA: No evidence of restricted diffusion identified. VASCULAR: Flow voids are symmetric bilaterally. CALVARIUM: There is normal bone marrow signal present. EXTRACRANIAL STRUCTURES: The orbits are visualized without abnormality. The visualized paranasal sinuses and mastoid air cells are clear. No evidence of ischemia, hemorrhage or mass. Findings of encephalomalacia in the frontal lobes may be related to previous insult or traumatic injury. Us Carotid Artery Bilateral    Result Date: 7/17/2018  Location:200 Exam: US CAROTID ARTERY BILATERAL Comparison: None. History: Altered mental status Findings: Grayscale, duplex Doppler, color-flow and spectral analysis sonography of the cervical arterial vessels. Mild atherosclerotic irregularity at the common carotid artery bifurcations. Peak systolic velocity proximal MO  93 cm/second and in the LICA 805 cm/second with systolic ratios of 0.8 and  1.0 respectively. Antegrade flow in the vertebral arteries. 1.  Sonographic estimate of less than 50% stenosis proximal MO. 2.  Sonographic estimate of less than 50% stenosis proximal LICA. 3.  Antegrade flow in the vertebral arteries. Physical Exam:  No intake/output data recorded. Intake/Output Summary (Last 24 hours) at 07/18/18 1514  Last data filed at 07/18/18 1433   Gross per 24 hour   Intake              907 ml   Output              775 ml   Net              132 ml   I/O last 3 completed shifts:   In: 907 [P.O.:60; I.V.:847]  Out: 775 [Urine:775]  Patient Vitals for the past 96 hrs (Last 3 readings):   Weight   07/17/18 0759 140 lb (63.5 kg)       Vital Signs:   Blood pressure (!) 151/67, pulse (!) 48, temperature 97.5 °F (36.4 °C), days: 1       Wound 07/17/18 Other (Comment) Hip Right reddened 4x6.5 (Active)   Dressing/Treatment Open to air 7/17/2018 11:41 PM   Wound Assessment Red 7/17/2018  3:27 PM   Number of days: 1       Wound 07/17/18 Other (Comment) Arm Right 8x6 hardened red area (Active)   Dressing/Treatment Open to air 7/17/2018 11:41 PM   Wound Length (cm) 8 cm 7/17/2018  3:27 PM   Wound Width (cm) 6 cm 7/17/2018  3:27 PM   Calculated Wound Size (cm^2) (l*w) 48 cm^2 7/17/2018  3:27 PM   Wound Assessment Edema;Painful;Red;Swelling 7/17/2018  3:27 PM   Number of days: 1       Wound 07/17/18 Arm Right;Upper 2x1 red (Active)   Dressing/Treatment Open to air 7/17/2018 11:41 PM   Wound Length (cm) 2 cm 7/17/2018  3:27 PM   Wound Width (cm) 1 cm 7/17/2018  3:27 PM   Calculated Wound Size (cm^2) (l*w) 2 cm^2 7/17/2018  3:27 PM   Wound Assessment Edema;Painful;Red;Swelling 7/17/2018  3:27 PM   Number of days: 1       Wound 07/17/18 Other (Comment) Hip Left; Outer red/bruised (Active)   Dressing/Treatment Open to air 7/17/2018 11:41 PM   Wound Assessment Red 7/17/2018  2:35 PM   Number of days: 1       Wound 07/17/18 Other (Comment) Thigh Outer red/bruised (Active)   Dressing/Treatment Open to air 7/17/2018 11:41 PM   Wound Assessment Red 7/17/2018  2:35 PM   Number of days: 1       Wound 07/17/18 Laceration Ankle Left; Outer 1x1 open  scab (Active)   Dressing/Treatment Open to air 7/17/2018 11:41 PM   Wound Length (cm) 1 cm 7/17/2018  2:35 PM   Wound Width (cm) 1 cm 7/17/2018  2:35 PM   Calculated Wound Size (cm^2) (l*w) 1 cm^2 7/17/2018  2:35 PM   Wound Assessment Drainage;Fragile;Painful;Red 7/17/2018  2:35 PM   Number of days: 1       Wound 07/17/18 Other (Comment) Heel Left red (Active)   Dressing/Treatment Open to air 7/17/2018 11:41 PM   Wound Assessment Red 7/17/2018  2:35 PM   Number of days: 1       Wound 07/17/18 Other (Comment) Heel Right dry (Active)   Dressing/Treatment Open to air 7/17/2018 11:41 PM   Number of days: 1 Wound 07/17/18 Other (Comment) Penis red (Active)   Dressing/Treatment Open to air 7/17/2018 11:41 PM   Wound Assessment Excoriated; Red 7/17/2018  2:35 PM   Number of days: 1       Wound 07/17/18 Skin tear Elbow Left 4x1 open scab (Active)   Dressing/Treatment Open to air 7/17/2018 11:41 PM   Wound Length (cm) 4 cm 7/17/2018  2:35 PM   Wound Width (cm) 1 cm 7/17/2018  2:35 PM   Calculated Wound Size (cm^2) (l*w) 4 cm^2 7/17/2018  2:35 PM   Wound Assessment Dark edges;Drainage;Fragile;Painful;Red 7/17/2018  2:35 PM   Number of days: 1       Wound 07/17/18 Buttocks Left;Right reddened (Active)   Dressing/Treatment Open to air 7/17/2018 11:41 PM   Wound Assessment Red 7/17/2018  2:35 PM   Number of days: 0       Chronic Hospital Medical Problems:  No past medical history on file. Active Problems:    Syncope and collapse  Resolved Problems:    * No resolved hospital problems. *      Assessment:  1. Rhabdomyolysis  2. Acute confusion, unknown origin  3. Sinus bradycardia  4. Malnutrition  5. Anemia  6. Right wrist drop    Plan:   [facility] consult cardiology secondary to bradycardia. Patient does have some neurologic deficit. MRI was performed and revealed no evidence of ischemia or hemorrhage or mass. Findings of encephalomalacia in the frontal lobes may be related to previous insult or traumatic injury. We'll consult physical medicine rehabilitation for further evaluation of right hand drop which may be secondary to brachial plexus pathology. Physical therapy and occupational therapy to evaluate and treat. Today's tolerated. Patient's medications were reviewed/continued/adjusted. Labs as ordered. Please see orders for further plan of care. More than 50% of my  time was spent counseling and/or coordination of care with the patient and/or family with face to face contact.      Time was spent reviewing notes and laboratory data, instructing and counseling the patient  regarding my findings and recommendations and

## 2018-07-18 NOTE — PLAN OF CARE
Problem: Falls - Risk of:  Goal: Will remain free from falls  Will remain free from falls   Outcome: Ongoing    Goal: Absence of physical injury  Absence of physical injury   Outcome: Ongoing      Problem: Risk for Impaired Skin Integrity  Goal: Tissue integrity - skin and mucous membranes  Structural intactness and normal physiological function of skin and  mucous membranes.    Outcome: Ongoing

## 2018-07-18 NOTE — PROGRESS NOTES
Pt Ex wife Leighann Damon was contacted for MRI screening   She states ex  is an acholic and the last two years has reduced the amount of alcohol  he consumes. however he is still drinking.

## 2018-07-18 NOTE — PROGRESS NOTES
1 step entry , laundry in the basement    PLOF: independent with BADL and used cane to ambulate    Equipment owned: straight cane    Cognition: alert, oriented x 2 and follows 2 step directions    fair  Problem solving skills    fair  Memory , forgets mary jo's name , and daughters' names   * has difficulty multitasking   * Gets confused easily , says his mary jo is 3 yrs old and doesn't remember whose house it is he lives in   * Fear of falling and blacking out   Communication: intact does concentrate better when environmental distractions are minimized   Visual perceptual skills: overshoots when  visually  tracking  laterally and passing midline , no nystagmus noted                 Glasses: yes  Edema: no  Sensation: poor R sided awareness tactilly and perceptually IE legs crossed , Mckenzie Higuera was asked to uncross legs ,his reply was I thought they were uncrossed  \"    Hand Dominance:  Left         X Right       Left Right Comment   Passive range of motion [facility]      Active range of motion [facility]      Muscle Grade 4/5 4-/5     /pinch Strength Good  Fair            Function Assessment     Status  Goal  Comment    Feeding: Moderate Assist using L hand  Moderate [place]      Grooming:  Maximal Assist  Supervision     UE dressing:  Maximal Assist  Supervision     LE dressing:  Maximal Assist  Minimal Assist     Bathing:  Maximal Assist  Minimal Assist     Bed Mobility: Moderate Assist for supine to sit,   Moderate Assist X2 for scooting,  Moderate Assist X2 for sit to supine     Supervision     Functional Transfers:     Moderate Assist for sit to stand transfers prefers one person on each side since he has fallen so many times  Minimal Assist Safety: fair          Functional Mobility: 3 feet with  hand held assist and extra time to motor plan and cope with slow response time    Minimal Assist     Balance:              Sitting: good               Standing: fair               Endurance: fair

## 2018-07-19 ENCOUNTER — TELEPHONE (OUTPATIENT)
Dept: PHYSICAL MEDICINE AND REHAB | Age: 78
End: 2018-07-19

## 2018-07-19 ENCOUNTER — APPOINTMENT (OUTPATIENT)
Dept: MRI IMAGING | Age: 78
DRG: 564 | End: 2018-07-19
Payer: MEDICARE

## 2018-07-19 LAB
ALBUMIN SERPL-MCNC: 2.6 G/DL (ref 3.5–5.2)
ALDOLASE: 9 U/L (ref 1.5–8.1)
ALP BLD-CCNC: 75 U/L (ref 40–129)
ALT SERPL-CCNC: 20 U/L (ref 0–40)
ANION GAP SERPL CALCULATED.3IONS-SCNC: 7 MMOL/L (ref 7–16)
ANTI-NUCLEAR ANTIBODY (ANA): NEGATIVE
AST SERPL-CCNC: 38 U/L (ref 0–39)
BILIRUB SERPL-MCNC: 0.5 MG/DL (ref 0–1.2)
BUN BLDV-MCNC: 13 MG/DL (ref 8–23)
CALCIUM SERPL-MCNC: 7.8 MG/DL (ref 8.6–10.2)
CHLORIDE BLD-SCNC: 107 MMOL/L (ref 98–107)
CO2: 27 MMOL/L (ref 22–29)
CREAT SERPL-MCNC: 0.8 MG/DL (ref 0.7–1.2)
EKG ATRIAL RATE: 30 BPM
EKG P AXIS: 30 DEGREES
EKG P-R INTERVAL: 124 MS
EKG Q-T INTERVAL: 590 MS
EKG QRS DURATION: 84 MS
EKG QTC CALCULATION (BAZETT): 492 MS
EKG R AXIS: 59 DEGREES
EKG T AXIS: 39 DEGREES
EKG VENTRICULAR RATE: 42 BPM
GFR AFRICAN AMERICAN: >60
GFR NON-AFRICAN AMERICAN: >60 ML/MIN/1.73
GLUCOSE BLD-MCNC: 85 MG/DL (ref 74–109)
HCT VFR BLD CALC: 32.6 % (ref 37–54)
HEMOGLOBIN: 10.5 G/DL (ref 12.5–16.5)
LV EF: 50 %
LVEF MODALITY: NORMAL
MCH RBC QN AUTO: 30.5 PG (ref 26–35)
MCHC RBC AUTO-ENTMCNC: 32.2 % (ref 32–34.5)
MCV RBC AUTO: 94.8 FL (ref 80–99.9)
PDW BLD-RTO: 13 FL (ref 11.5–15)
PLATELET # BLD: 215 E9/L (ref 130–450)
PMV BLD AUTO: 11.5 FL (ref 7–12)
POTASSIUM SERPL-SCNC: 3.8 MMOL/L (ref 3.5–5)
RBC # BLD: 3.44 E12/L (ref 3.8–5.8)
SODIUM BLD-SCNC: 141 MMOL/L (ref 132–146)
TOTAL CK: 624 U/L (ref 20–200)
TOTAL PROTEIN: 5.9 G/DL (ref 6.4–8.3)
WBC # BLD: 6.5 E9/L (ref 4.5–11.5)

## 2018-07-19 PROCEDURE — 97116 GAIT TRAINING THERAPY: CPT

## 2018-07-19 PROCEDURE — 80053 COMPREHEN METABOLIC PANEL: CPT

## 2018-07-19 PROCEDURE — 6360000002 HC RX W HCPCS: Performed by: INTERNAL MEDICINE

## 2018-07-19 PROCEDURE — 2580000003 HC RX 258: Performed by: INTERNAL MEDICINE

## 2018-07-19 PROCEDURE — 86225 DNA ANTIBODY NATIVE: CPT

## 2018-07-19 PROCEDURE — 72148 MRI LUMBAR SPINE W/O DYE: CPT

## 2018-07-19 PROCEDURE — 93306 TTE W/DOPPLER COMPLETE: CPT

## 2018-07-19 PROCEDURE — 6370000000 HC RX 637 (ALT 250 FOR IP): Performed by: INTERNAL MEDICINE

## 2018-07-19 PROCEDURE — 1200000000 HC SEMI PRIVATE

## 2018-07-19 PROCEDURE — 36415 COLL VENOUS BLD VENIPUNCTURE: CPT

## 2018-07-19 PROCEDURE — 72141 MRI NECK SPINE W/O DYE: CPT

## 2018-07-19 PROCEDURE — 85027 COMPLETE CBC AUTOMATED: CPT

## 2018-07-19 PROCEDURE — 97530 THERAPEUTIC ACTIVITIES: CPT

## 2018-07-19 PROCEDURE — 6360000002 HC RX W HCPCS: Performed by: EMERGENCY MEDICINE

## 2018-07-19 PROCEDURE — 82550 ASSAY OF CK (CPK): CPT

## 2018-07-19 PROCEDURE — 92526 ORAL FUNCTION THERAPY: CPT | Performed by: SPEECH-LANGUAGE PATHOLOGIST

## 2018-07-19 RX ORDER — FENTANYL CITRATE 50 UG/ML
25 INJECTION, SOLUTION INTRAMUSCULAR; INTRAVENOUS
Status: DISCONTINUED | OUTPATIENT
Start: 2018-07-19 | End: 2018-07-21

## 2018-07-19 RX ADMIN — FENTANYL CITRATE 25 MCG: 50 INJECTION, SOLUTION INTRAMUSCULAR; INTRAVENOUS at 15:02

## 2018-07-19 RX ADMIN — Medication 10 ML: at 08:21

## 2018-07-19 RX ADMIN — FOLIC ACID 1 MG: 1 TABLET ORAL at 08:21

## 2018-07-19 RX ADMIN — ENOXAPARIN SODIUM 40 MG: 40 INJECTION, SOLUTION INTRAVENOUS; SUBCUTANEOUS at 19:05

## 2018-07-19 RX ADMIN — Medication 10 ML: at 21:48

## 2018-07-19 RX ADMIN — Medication 100 MG: at 08:21

## 2018-07-19 ASSESSMENT — PAIN SCALES - GENERAL
PAINLEVEL_OUTOF10: 2
PAINLEVEL_OUTOF10: 8
PAINLEVEL_OUTOF10: 0

## 2018-07-19 NOTE — PROGRESS NOTES
Internal Medicine Progress Note    [facility]. Muriel Herndon., & [address] Muriel Herndon., F.A.C.O.I. Megan Wesley D.O., F.A.C.O.I. Primary Care Physician: No primary care provider on file. Admitting Physician:  Susan Lezama DO  Admission date and time: 7/17/2018  7:21 AM    Room:  [address] [place] [address]  Admitting diagnosis: Syncope and collapse [R55]    Patient Name: Linh Duarte  MRN: [id number]    Date of Service: 7/19/2018     Subjective:    Ellen Monreal is a 78 y. o.  male who was seen and examined today,7/19/2018, at the bedside. The patient is oriented to person place and time today. Patient continues to complain of right upper extremity weakness. Patient has complaints of generalized musculoskeletal pain. Physical therapy is present and states that the patient does have a scissor gate and is unsteady on his feet. Patient was also found to be incontinent of stool when he was ambulated. No family present during my examination. I reviewed the patient's past medical, surgical history and medication. I reviewed the  course of events since last visit. Review of System:  Constitutional:   Negative for fever and chills. Fatigued. HEENT:   Negative for ear pain, sore throat, rhinorrhea and sinus pressure. Negative for eye pain, discharge and redness. Psch:   Denies depression or anxiety. Cardiovascular:   Denies any chest pain, irregular heartbeats, or palpitations. Respiratory:   Denies shortness of breath, coughing, sputum production, hemoptysis, or wheezing. Gastrointestinal:   Denies nausea, vomiting, diarrhea, or constipation. Incontinent of stool. Denies any abdominal pain. Extremities:   Denies any lower extremity swelling or edema. Neurology:    Denies any headache or focal neurological deficits. Generalized weakness.  Weakness of the right upper extremity-inability to lift independently, and wrist drop  Derm:   Denies any rashes, ulcers, or ABGs: No results found for: PHART, PO2ART, OSS1HDD  INR: No results for input(s): INR, PROTIME in the last 72 hours. RAD: Xr Hip Bilateral W Ap Pelvis (2 Views)    Result Date: 7/17/2018  Reading location: 200 INDICATION: Fall FINDINGS: 5 views of pelvis and hips. Lower lumbar spondylosis. Intact alignment at the hips. Hip joint spaces relatively preserved. No fracture. No fracture. Ct Head Wo Contrast    Result Date: 7/17/2018  Location:200 Exam: CT HEAD WO CONTRAST Comparison: 8/6/2017 History: Fall, altered mental status Findings: Noncontrast images were obtained through the brain parenchyma. Automated dose control. Ventricles are midline and nondilated. No acute abnormality of the brain parenchyma, intracranial hemorrhage, mass effect or large extra-axial fluid collection. Bifrontal encephalomalacia and cortical atrophy again noted. No CT evidence of acute intracranial injury. Ct Cervical Spine Wo Contrast    Result Date: 7/17/2018  Reading location: 200 INDICATION: Fall, altered mental status FINDINGS: Axial images obtained through the cervical spine. Computer reconstruction images in sagittal and coronal planes. Comparison with 10/8/2010. Automated dose control. Unremarkable prevertebral soft tissues. Intact cervical vertebral height. Multilevel disc and facet joint narrowing with spurring again noted. 6 mm anterolisthesis C6-7, cyst similar to prior. No fracture. 1. No fracture. 2. Multilevel spondylosis. Xr Chest Portable    Result Date: 7/17/2018  Location:200 Exam: XR CHEST PORTABLE Comparison: 8/7/2011 History: Fatigue Findings: Portable AP upright chest. Heart size is normal. Pulmonary vessels are nondistended. No focal pulmonary parenchymal consolidation. No acute cardiopulmonary disease.      Mri Brain Wo Contrast    Result Date: 7/18/2018  LOCATION: 200 EXAM: MRI BRAIN WO CONTRAST COMPARISON: None HISTORY: Confusion TECHNIQUE: Standard multiplanar multisequence imaging 1370 ml   Output              875 ml   Net              495 ml   I/O last 3 completed shifts: In: 1370 [P.O.:540; I.V.:830]  Out: 875 [Urine:875]  Patient Vitals for the past 96 hrs (Last 3 readings):   Weight   07/18/18 1544 140 lb (63.5 kg)   07/17/18 0759 140 lb (63.5 kg)       Vital Signs:   Blood pressure 128/68, pulse 56, temperature 97.9 °F (36.6 °C), resp. rate 18, height 6' (1.829 m), weight 140 lb (63.5 kg), SpO2 99 %. General appearance:   Blas Ramon is a 78 y. o.  male who is alert, oriented to person only. Cachectic and ill-appearing. In no acute distress. Psych:   Behavior is normal. Mood appears normal. Speech is not rapid and/or pressured. HEENT:   PERRLA. EOMI. Sclera clear. Buccal mucosa moist. Poor dentition. Neck:    Supple. Trachea midline. No thyromegaly. No JVD. No bruits. Heart:    Rhythm regular, rate controlled. No murmurs. Sinus bradycardia     Lungs:  Symmetrical. Clear to auscultation bilaterally. No wheezes. No rhonchi. No rales. Abdomen:   Soft. Non-tender. Non-distended. Bowel sounds positive. No organomegaly or masses. No pain on palpation. Extremities:    Peripheral pulses present. No peripheral edema. No ulcers. Neurologic:    Alert and oriented to person. .  Patient does follow commands. Right hand  is slightly less than the left. Sensation intact. Right wrist drop. Speech is thick at times and difficult to understand. Skin:    No rashes.  Wound Documentation:   Wound 07/17/18 Abrasion(s) Nose Upper bridge of nose red/abrasion (Active)   Wound Type Wound 7/17/2018 11:41 PM   Dressing/Treatment Open to air 7/17/2018 11:41 PM   Hanna-wound Assessment Red 7/17/2018  3:27 PM   Number of days: 1       Wound 07/17/18 Back Left red (Active)   Dressing/Treatment Open to air 7/17/2018 11:41 PM   Wound Assessment Red 7/17/2018  3:27 PM   Number of days: 1       Wound 07/17/18 Blister Back Upper 2 intact blisters with surrounding area red 14x7 (Active)   Dressing/Treatment Open to air 7/17/2018 11:41 PM   Wound Length (cm) 14 cm 7/17/2018  3:27 PM   Wound Width (cm) 7 cm 7/17/2018  3:27 PM   Calculated Wound Size (cm^2) (l*w) 98 cm^2 7/17/2018  3:27 PM   Wound Assessment Painful;Red;Slough 7/17/2018  3:27 PM   Number of days: 1       Wound 07/17/18 Other (Comment) Hip Right reddened 4x6.5 (Active)   Dressing/Treatment Open to air 7/17/2018 11:41 PM   Wound Assessment Red 7/17/2018  3:27 PM   Number of days: 1       Wound 07/17/18 Other (Comment) Arm Right 8x6 hardened red area (Active)   Dressing/Treatment Open to air 7/17/2018 11:41 PM   Wound Length (cm) 8 cm 7/17/2018  3:27 PM   Wound Width (cm) 6 cm 7/17/2018  3:27 PM   Calculated Wound Size (cm^2) (l*w) 48 cm^2 7/17/2018  3:27 PM   Wound Assessment Edema;Painful;Red;Swelling 7/17/2018  3:27 PM   Number of days: 1       Wound 07/17/18 Arm Right;Upper 2x1 red (Active)   Dressing/Treatment Open to air 7/17/2018 11:41 PM   Wound Length (cm) 2 cm 7/17/2018  3:27 PM   Wound Width (cm) 1 cm 7/17/2018  3:27 PM   Calculated Wound Size (cm^2) (l*w) 2 cm^2 7/17/2018  3:27 PM   Wound Assessment Edema;Painful;Red;Swelling 7/17/2018  3:27 PM   Number of days: 1       Wound 07/17/18 Other (Comment) Hip Left; Outer red/bruised (Active)   Dressing/Treatment Open to air 7/17/2018 11:41 PM   Wound Assessment Red 7/17/2018  2:35 PM   Number of days: 1       Wound 07/17/18 Other (Comment) Thigh Outer red/bruised (Active)   Dressing/Treatment Open to air 7/17/2018 11:41 PM   Wound Assessment Red 7/17/2018  2:35 PM   Number of days: 1       Wound 07/17/18 Laceration Ankle Left; Outer 1x1 open  scab (Active)   Dressing/Treatment Open to air 7/17/2018 11:41 PM   Wound Length (cm) 1 cm 7/17/2018  2:35 PM   Wound Width (cm) 1 cm 7/17/2018  2:35 PM   Calculated Wound Size (cm^2) (l*w) 1 cm^2 7/17/2018  2:35 PM   Wound Assessment Drainage;Fragile;Painful;Red 7/17/2018  2:35 PM   Number of days: 1       Wound 07/17/18 Other (Comment) Heel Left red (Active)   Dressing/Treatment Open to air 7/17/2018 11:41 PM   Wound Assessment Red 7/17/2018  2:35 PM   Number of days: 1       Wound 07/17/18 Other (Comment) Heel Right dry (Active)   Dressing/Treatment Open to air 7/17/2018 11:41 PM   Number of days: 1       Wound 07/17/18 Other (Comment) Penis red (Active)   Dressing/Treatment Open to air 7/17/2018 11:41 PM   Wound Assessment Excoriated; Red 7/17/2018  2:35 PM   Number of days: 1       Wound 07/17/18 Skin tear Elbow Left 4x1 open scab (Active)   Dressing/Treatment Open to air 7/17/2018 11:41 PM   Wound Length (cm) 4 cm 7/17/2018  2:35 PM   Wound Width (cm) 1 cm 7/17/2018  2:35 PM   Calculated Wound Size (cm^2) (l*w) 4 cm^2 7/17/2018  2:35 PM   Wound Assessment Dark edges;Drainage;Fragile;Painful;Red 7/17/2018  2:35 PM   Number of days: 1       Wound 07/17/18 Buttocks Left;Right reddened (Active)   Dressing/Treatment Open to air 7/17/2018 11:41 PM   Wound Assessment Red 7/17/2018  2:35 PM   Number of days: 0       Chronic Hospital Medical Problems:  No past medical history on file. Active Problems:    Syncope and collapse    Right wrist drop  Resolved Problems:    * No resolved hospital problems. *      Assessment:  1. Rhabdomyolysis  2. Acute confusion, unknown origin  3. Sinus bradycardia  4. Encephalopathy with underlying malnutrition, history of alcohol abuse and rhabdomyolysis  5. Mechanical fall  6. Moderate to severe protein calorie malnutrition  7. Anemia  8. Right wrist drop    Plan:   Cardiology was consulted and evaluated the patient. Patient may likely require pacemaker implantation secondary to bradycardia. Patient continues to exhibit neurologic deficit. No family here therefore baseline is unknown. We'll obtain MRI of the lumbar spine as well as the cervical spine to further evaluate for etiology of right upper extremity weakness/wrist drop. Await physical medicine rehabilitation evaluation of the patient and recommendation. Physical therapy and occupational therapy to evaluate and treat. Patient's medications were reviewed/continued/adjusted. Labs as ordered. Please see orders for further plan of care. We'll have the dietitian evaluate the patient secondary to poor nutritional status. Dietary supplementation as ordered. More than 50% of my  time was spent counseling and/or coordination of care with the patient and/or family with face to face contact. Time was spent reviewing notes and laboratory data, instructing and counseling the patient  regarding my findings and recommendations and reviewing and answer questions. Caleb Jade DO, F.A.CEnmaO.I.   On 7/19/2018  3:49 PM   3:49 PM

## 2018-07-19 NOTE — PROGRESS NOTES
Received phone call from Dr. Kelley Morrow office. She feels patient needs EMG. As this service is not available in this facility the patient can follow-up with Dr. Mireya Weaver for EMG as an outpatient with wrist splints and begin therapy until seen for EMG or transferred to [facility] for EMG.   Electronically signed by Livia Samuels RN on 7/19/2018 at 2:04 PM

## 2018-07-19 NOTE — CARE COORDINATION
SS NOTE: [facility] will accept pt for admission tomorrow if medically stable to transfer. Pt will need a signed N17. David Stallings. 7/19/2018.1:04 PM.

## 2018-07-19 NOTE — PROGRESS NOTES
Received call from MRI regarding ordered tests. Tech states pt had MRI yesterday and was unable to lie still for test d/t pain. Tech requesting something to assist for MRI, as they are sending for him shortly. Dr Wayne Prisca on floor and notified.

## 2018-07-20 PROBLEM — E44.0 MODERATE PROTEIN-CALORIE MALNUTRITION (HCC): Status: ACTIVE | Noted: 2018-07-20

## 2018-07-20 LAB
ALBUMIN SERPL-MCNC: 2.6 G/DL (ref 3.5–5.2)
ALP BLD-CCNC: 78 U/L (ref 40–129)
ALT SERPL-CCNC: 19 U/L (ref 0–40)
ANION GAP SERPL CALCULATED.3IONS-SCNC: 8 MMOL/L (ref 7–16)
ANTI DNA DOUBLE STRANDED: NEGATIVE
AST SERPL-CCNC: 29 U/L (ref 0–39)
BILIRUB SERPL-MCNC: 0.5 MG/DL (ref 0–1.2)
BUN BLDV-MCNC: 9 MG/DL (ref 8–23)
CALCIUM SERPL-MCNC: 7.9 MG/DL (ref 8.6–10.2)
CHLORIDE BLD-SCNC: 105 MMOL/L (ref 98–107)
CO2: 29 MMOL/L (ref 22–29)
CREAT SERPL-MCNC: 0.8 MG/DL (ref 0.7–1.2)
GFR AFRICAN AMERICAN: >60
GFR NON-AFRICAN AMERICAN: >60 ML/MIN/1.73
GLUCOSE BLD-MCNC: 86 MG/DL (ref 74–109)
HCT VFR BLD CALC: 33.6 % (ref 37–54)
HEMOGLOBIN: 11.1 G/DL (ref 12.5–16.5)
MCH RBC QN AUTO: 30.9 PG (ref 26–35)
MCHC RBC AUTO-ENTMCNC: 33 % (ref 32–34.5)
MCV RBC AUTO: 93.6 FL (ref 80–99.9)
PDW BLD-RTO: 12.7 FL (ref 11.5–15)
PLATELET # BLD: 234 E9/L (ref 130–450)
PMV BLD AUTO: 11.7 FL (ref 7–12)
POTASSIUM SERPL-SCNC: 3.7 MMOL/L (ref 3.5–5)
RBC # BLD: 3.59 E12/L (ref 3.8–5.8)
RPR: NORMAL
SODIUM BLD-SCNC: 142 MMOL/L (ref 132–146)
TOTAL PROTEIN: 5.9 G/DL (ref 6.4–8.3)
URINE CULTURE, ROUTINE: NORMAL
WBC # BLD: 6.5 E9/L (ref 4.5–11.5)

## 2018-07-20 PROCEDURE — 80053 COMPREHEN METABOLIC PANEL: CPT

## 2018-07-20 PROCEDURE — 97110 THERAPEUTIC EXERCISES: CPT

## 2018-07-20 PROCEDURE — 85027 COMPLETE CBC AUTOMATED: CPT

## 2018-07-20 PROCEDURE — 1200000000 HC SEMI PRIVATE

## 2018-07-20 PROCEDURE — 6370000000 HC RX 637 (ALT 250 FOR IP): Performed by: INTERNAL MEDICINE

## 2018-07-20 PROCEDURE — 2580000003 HC RX 258: Performed by: EMERGENCY MEDICINE

## 2018-07-20 PROCEDURE — 36415 COLL VENOUS BLD VENIPUNCTURE: CPT

## 2018-07-20 PROCEDURE — 97530 THERAPEUTIC ACTIVITIES: CPT

## 2018-07-20 PROCEDURE — 92526 ORAL FUNCTION THERAPY: CPT | Performed by: SPEECH-LANGUAGE PATHOLOGIST

## 2018-07-20 PROCEDURE — 97110 THERAPEUTIC EXERCISES: CPT | Performed by: SPEECH-LANGUAGE PATHOLOGIST

## 2018-07-20 PROCEDURE — 86592 SYPHILIS TEST NON-TREP QUAL: CPT

## 2018-07-20 PROCEDURE — 97116 GAIT TRAINING THERAPY: CPT

## 2018-07-20 PROCEDURE — 6360000002 HC RX W HCPCS: Performed by: EMERGENCY MEDICINE

## 2018-07-20 RX ADMIN — FOLIC ACID 1 MG: 1 TABLET ORAL at 08:55

## 2018-07-20 RX ADMIN — ENOXAPARIN SODIUM 40 MG: 40 INJECTION, SOLUTION INTRAVENOUS; SUBCUTANEOUS at 17:46

## 2018-07-20 RX ADMIN — Medication 100 MG: at 08:55

## 2018-07-20 RX ADMIN — SODIUM CHLORIDE: 9 INJECTION, SOLUTION INTRAVENOUS at 10:29

## 2018-07-20 ASSESSMENT — PAIN SCALES - GENERAL
PAINLEVEL_OUTOF10: 0

## 2018-07-20 NOTE — PROGRESS NOTES
Patient is seen for Sinus bradycardia    Subjective:     Mr. Sabine Barron  States he is still really tired today, but feels okay. He reported that he sat up in the chair for a long time today and felt good doing so. He denies any chest pain or shortness of breath. Patient laying in bed, in no acute distress     ROS:  CONSTITUTIONAL: positive fatigue  negative for  fevers, chills  GASTROINTESTINAL:Positive for decreased appetite  negative for nausea, vomiting  NEUROLOGICAL: Positive generalized weakness and dizziness  negative for visual disturbance, dysphagia    Medication side effects: none    Scheduled Meds:   vitamin B-1  100 mg Oral Daily    folic acid  1 mg Oral Daily    sodium chloride flush  10 mL Intravenous 2 times per day    enoxaparin  40 mg Subcutaneous Daily     Continuous Infusions:   sodium chloride 100 mL/hr at 07/20/18 1029     PRN Meds:fentanNYL, perflutren lipid microspheres, sodium chloride flush, acetaminophen, magnesium hydroxide, ondansetron      Objective:      Physical Exam:   BP (!) 130/59   Pulse 57   Temp 98.4 °F (36.9 °C) (Oral)   Resp 16   Ht 6' (1.829 m)   Wt 140 lb (63.5 kg)   SpO2 96%   BMI 18.99 kg/m²   CONSTITUTIONAL:  awake, alert, cooperative, no apparent distress, and appears stated age  HEAD:  normocepalic, without obvious abnormality, atraumatic, pink, moist mucous membranes. NECK:  Supple, symmetrical, trachea midline  LUNGS:  No increased work of breathing, diminished air exchange, clear to auscultation bilaterally, no crackles or wheezing  CARDIOVASCULAR:  Normal apical impulse, regular rate and rhythm, normal S1 and S2, no S3 or S4, and no murmur noted and no JVD, no carotid bruit, no pedal edema, good carotid upstroke bilaterally. ABDOMEN:  Soft, nontender  CHEST: nontender to palpation, expands symmetrically  MUSCULOSKELETAL:  No clubbing no cyanosis. there is no redness, warmth, or swelling of the joints  full range of motion noted  NEUROLOGIC:  Alert, canal narrowing from C3-C4 to C5-C6. 4. Cervical cord is normal in signal.      MRI Brain WO Contrast   Final Result   No evidence of ischemia, hemorrhage or mass. Findings of   encephalomalacia in the frontal lobes may be related to previous   insult or traumatic injury. US CAROTID ARTERY BILATERAL   Final Result   1. Sonographic estimate of less than 50% stenosis proximal MO. 2.  Sonographic estimate of less than 50% stenosis proximal LICA. 3.  Antegrade flow in the vertebral arteries. XR HIP BILATERAL W AP PELVIS (2 VIEWS)   Final Result   No fracture. CT Cervical Spine WO Contrast   Final Result   1. No fracture. 2. Multilevel spondylosis. CT Head WO Contrast   Final Result   No CT evidence of acute intracranial injury. XR CHEST PORTABLE   Final Result   No acute cardiopulmonary disease.            Lab Review   Lab Results   Component Value Date     07/20/2018     07/19/2018     07/18/2018    K 3.7 07/20/2018    K 3.8 07/19/2018    K 3.8 07/18/2018    K 4.5 07/17/2018    CO2 29 07/20/2018    CO2 27 07/19/2018    CO2 25 07/18/2018    BUN 9 07/20/2018    BUN 13 07/19/2018    BUN 18 07/18/2018    CREATININE 0.8 07/20/2018    CREATININE 0.8 07/19/2018    CREATININE 0.9 07/18/2018    GLUCOSE 86 07/20/2018    GLUCOSE 85 07/19/2018    GLUCOSE 72 07/18/2018    GLUCOSE 83 08/06/2011    GLUCOSE 133 08/05/2011    GLUCOSE 107 08/04/2011    CALCIUM 7.9 07/20/2018    CALCIUM 7.8 07/19/2018    CALCIUM 7.8 07/18/2018     Lab Results   Component Value Date    CKTOTAL 624 07/19/2018    CKTOTAL 1,210 07/17/2018    CKTOTAL 1,494 07/17/2018    CKMB 13.5 07/17/2018    CKMB 17.8 07/17/2018    TROPONINI 0.03 07/17/2018    TROPONINI 0.02 07/17/2018    TROPONINI 0.03 07/17/2018     Lab Results   Component Value Date    WBC 6.5 07/20/2018    WBC 6.5 07/19/2018    WBC 7.1 07/18/2018    HGB 11.1 07/20/2018    HGB 10.5 07/19/2018    HGB 10.3 07/18/2018    HCT 33.6 07/20/2018    HCT 32.6

## 2018-07-20 NOTE — CONSULTS
Inpatient consult to Neurology  Consult performed by: Bc Mauro  Consult ordered by: Alida Santos  Assessment/Recommendations: History Of Present Illness: History of Present Illness  Maxi Lr is a 78 y.o. male with no past medical history who presents to [facility] complaining of fatigue, weakness, and a questionable fall or syncopal episode. Patient is a poor historian and confused to conversation. He is unable to recall any events of the last few days. Family is unavailable at this time. Patient states that he lives with his brother and his wife and nursing staff report that his ex-wife is present at the bedside earlier. Per EMR and the ED staff, family reported patient had not been eating or drinking for the last couple weeks and was last seen at 11 PM last night and found this morning on the floor. Patient was conscious when found this morning however cannot recall how he got to the floor or how long he had been laying there. Patient has no recorded medical history and states that he does not see a doctor in a regular basis patient cannot recall the last time he saw a physician and states that he only sees them for \"little things like my teeth\". Patient does report some alcohol use however states he drinks only occasionally and none in the last month. He reports that he quit smoking 10-15 years ago. As above per Dr Lauri Valadez. Patient interviewed and noted to be poor historian--he is unsure if wrist drop occurred at time of fall. The patient is a 78 y.o. male with significant past medical history of see below who presents with above.       The patient has the following symptoms:    Change in level of consciousness: alert    New Weakness: yes    Numbness or Tingling: no    Difficulty Swallowing: no    Current Medications:   Scheduled Meds:vitamin B-1, 100 mg, Oral, Daily  folic acid, 1 mg, Oral, Daily  sodium chloride flush, 10 mL, Intravenous, 2 times per day  enoxaparin, 40 mg,

## 2018-07-20 NOTE — PROGRESS NOTES
Physical Therapy  Daily Treatment Note  7/20/2018  [id number]                      Dayanara Ramirez   [id number]                              1940    Patient Active Problem List   Diagnosis    Syncope and collapse    Right wrist drop       Recommendation for discharge: acute rehab  Equipment prescriptions needed: none  AM-PAC Basic Mobility    Key: total(1); a lot (2); a little (3): none (4)  How much help from another person is needed. ..  2  1. Turning from your back to your side while in a flat bed without using bed rails?    2  2. Moving from lying on your back to sitting on the side of a flat bed w/o using bed rails? 2  3. Moving to and from a bed to a chair or wheelchair?     2  4. Standing up from a chair using your arms?    2  5. To walk in a hospital room?    1  6. Climbing 3-5 steps with a railing? Raw score:  11/24    Precautions: falls, alarm, R wrist drop, confusion, depends    S: Patient cleared by nursing for treatment. Patient is agreeable to treatment. Pain status: (measured on a visual analog scale with 0=no pain and 10=excruciating pain) 0/10. O: Pt was instructed in and performed the following:   Bed Mobility- Supine to sit- Moderate/Minimal assistance      Scooting- Moderate assistance d/t unable to use RUE    Sit to supine- NA   Transfers-sit to stand- Moderate assistance with cues for correct hand placement      Gait: Patient ambulated 45 feet x 2 using Wheeled Walker with Moderate assistance. Assist of another for equipment. Comments: V/C for safety, increased JOSH, decreased pace, upright posture, obstacle negotiation, decreased space from walker, walker management, and technique. Steps: NA  Treatment: Pt transferred to side of bed, sat up x 5 minutes with supervision, and ambulated in hallway. Pt returned to chair and performed seated ankle pumps, LAQ, and marching x 10 reps. V/C for technique. Comment: Pt sitting in chair at end of treatment, alarm activated.  Call light left by patient. A: Pt able to progress with increased ambulation distance but con't to be at risk of falls d/t unsteady, narrow JOSH, fast pace, and poor motor control. Pt confused at times and required frequent cues for safety and to stay on task. Pt requires one step directions. P: Continue with physical therapy   Laura Martin, PTA    Goals to be met in 3 days. Bed mobility-Minimal assists    Transfers-Minimal assists    Ambulation-Minimal assists for 40 feet using  wheeled walker vs cane pending right hand/wrist      Increase strength in affected muscle groups by 1/3 grade  Increase balance to allow for improvement towards functional goals. Increase endurance to allow for improvement towards functional goals.

## 2018-07-20 NOTE — PROGRESS NOTES
Pt confused at times and required frequent cues for safety and to stay on task. Pt requires one step directions. P: Continue with physical therapy   Laura Martin PTA    Goals to be met in 3 days. Bed mobility-Minimal assists    Transfers-Minimal assists    Ambulation-Minimal assists for 40 feet using  wheeled walker vs cane pending right hand/wrist      Increase strength in affected muscle groups by 1/3 grade  Increase balance to allow for improvement towards functional goals. Increase endurance to allow for improvement towards functional goals.

## 2018-07-20 NOTE — PROGRESS NOTES
Speech Language Pathology      NAME:  Linh Duarte  :  1940  DATE: 2018  ROOM:  [facility]/3601-29    Patient Active Problem List   Diagnosis    Syncope and collapse    Right wrist drop    Moderate protein-calorie malnutrition ([facility] [address]       Patient seen for swallow therapy 15 minutes. Patient reported increased intake during breakfast this am. Patient adequately explained how he problem solved his previous difficulty feeding with his weakened right wrist. Patient education completed regarding compensatory strategies to improve ability to feed independently. Patient indicated understanding via verbal response but may benefit from repeated education secondary to impaired cognition. Will continue POC.     Silvia Gutierrez  Speech Language Pathology Student

## 2018-07-20 NOTE — CONSULTS
133 lb (60.3 kg) (8/6/17 per EMR)  · % Weight Change: 5% gain,  11 months  · Ideal Body Wt: 178 lb (80.7 kg), % Ideal Body 79%  · BMI Classification: BMI 18.5 - 24.9 Normal Weight  · Comparative Standards (Estimated Nutrition Needs):  · Estimated Daily Total Kcal: 3362-0787 (x 1.2-1.3 SF)  · Estimated Daily Protein (g): 75-85    Estimated Intake vs Estimated Needs: Intake Less Than Needs    Nutrition Risk Level: Moderate    Nutrition Interventions:   Continue current diet, Continue current ONS  Continued Inpatient Monitoring, Speech Therapy, Swallow Evaluation, Education Not Indicated, Coordination of Care    Nutrition Evaluation:   · Evaluation: Progressing toward goals   · Goals: Consume 75%+ diet/ONS    · Monitoring: Meal Intake, Supplement Intake, Diet Tolerance, Skin Integrity, Fluid Balance, Wound Healing, Mental Status/Confusion, Weight, Comparative Standards, Pertinent Labs, Chewing/Swallowing    See Adult Nutrition Doc Flowsheet for more detail.      Electronically signed by Susan Garcia RD, MAURI on 7/20/18 at 10:45 AM    Contact Number: [phone]

## 2018-07-20 NOTE — PROGRESS NOTES
0610   CHOL  97   HDL  45     ABGs: No results found for: PHART, PO2ART, [id number]  INR: No results for input(s): INR, PROTIME in the last 72 hours. RAD: Xr Hip Bilateral W Ap Pelvis (2 Views)    Result Date: 7/17/2018  Reading location: 200 INDICATION: Fall FINDINGS: 5 views of pelvis and hips. Lower lumbar spondylosis. Intact alignment at the hips. Hip joint spaces relatively preserved. No fracture. No fracture. Ct Head Wo Contrast    Result Date: 7/17/2018  Location:200 Exam: CT HEAD WO CONTRAST Comparison: 8/6/2017 History: Fall, altered mental status Findings: Noncontrast images were obtained through the brain parenchyma. Automated dose control. Ventricles are midline and nondilated. No acute abnormality of the brain parenchyma, intracranial hemorrhage, mass effect or large extra-axial fluid collection. Bifrontal encephalomalacia and cortical atrophy again noted. No CT evidence of acute intracranial injury. Ct Cervical Spine Wo Contrast    Result Date: 7/17/2018  Reading location: 200 INDICATION: Fall, altered mental status FINDINGS: Axial images obtained through the cervical spine. Computer reconstruction images in sagittal and coronal planes. Comparison with 10/8/2010. Automated dose control. Unremarkable prevertebral soft tissues. Intact cervical vertebral height. Multilevel disc and facet joint narrowing with spurring again noted. 6 mm anterolisthesis C6-7, cyst similar to prior. No fracture. 1. No fracture. 2. Multilevel spondylosis. Xr Chest Portable    Result Date: 7/17/2018  Location:200 Exam: XR CHEST PORTABLE Comparison: 8/7/2011 History: Fatigue Findings: Portable AP upright chest. Heart size is normal. Pulmonary vessels are nondistended. No focal pulmonary parenchymal consolidation. No acute cardiopulmonary disease.      Mri Brain Wo Contrast    Result Date: 7/18/2018  LOCATION: 200 EXAM: MRI BRAIN WO CONTRAST COMPARISON: None HISTORY: Confusion TECHNIQUE: Standard multiplanar multisequence imaging of the brain was performed. CONTRAST: No contrast was administered. FINDINGS:  PARENCHYMA: Mild amount of confluent white matter signal abnormality is noted in the subcortical and periventricular white matter most notable in the frontal lobes. There is associated encephalomalacia seen involving bilateral frontal lobes. VENTRICLES IN MIDLINE STRUCTURES: The midline structures appear normal. The ventricles, sulci and cisterns are normal in size and configuration. EXTRA-AXIAL SPACES: The extra-axial spaces appear normal with no mass lesions or fluid collections. ENHANCEMENT: Contrast was not administered. ISCHEMIA: No evidence of restricted diffusion identified. VASCULAR: Flow voids are symmetric bilaterally. CALVARIUM: There is normal bone marrow signal present. EXTRACRANIAL STRUCTURES: The orbits are visualized without abnormality. The visualized paranasal sinuses and mastoid air cells are clear. No evidence of ischemia, hemorrhage or mass. Findings of encephalomalacia in the frontal lobes may be related to previous insult or traumatic injury. Us Carotid Artery Bilateral    Result Date: 7/17/2018  Location:200 Exam: US CAROTID ARTERY BILATERAL Comparison: None. History: Altered mental status Findings: Grayscale, duplex Doppler, color-flow and spectral analysis sonography of the cervical arterial vessels. Mild atherosclerotic irregularity at the common carotid artery bifurcations. Peak systolic velocity proximal MO  93 cm/second and in the LICA 910 cm/second with systolic ratios of 0.8 and  1.0 respectively. Antegrade flow in the vertebral arteries. 1.  Sonographic estimate of less than 50% stenosis proximal MO. 2.  Sonographic estimate of less than 50% stenosis proximal LICA. 3.  Antegrade flow in the vertebral arteries. Physical Exam:  No intake/output data recorded.     Intake/Output Summary (Last 24 hours) at 07/20/18 5347  Last data filed at 07/20/18 1352   Gross per 24 hour   Intake             2505 ml   Output              300 ml   Net             2205 ml   I/O last 3 completed shifts: In: 2505 [P.O.:880; I.V.:1625]  Out: 300 [Urine:300]  Patient Vitals for the past 96 hrs (Last 3 readings):   Weight   07/18/18 1544 140 lb (63.5 kg)   07/17/18 0759 140 lb (63.5 kg)       Vital Signs:   Blood pressure (!) 130/59, pulse 57, temperature 98.4 °F (36.9 °C), temperature source Oral, resp. rate 16, height 6' (1.829 m), weight 140 lb (63.5 kg), SpO2 96 %. General appearance:   Moustapha Pena is a 78 y. o.  male who is alert, oriented to person only. Cachectic and ill-appearing. In no acute distress. Psych:   Behavior is normal. Mood appears normal. Speech is not rapid and/or pressured. HEENT:   PERRLA. EOMI. Sclera clear. Buccal mucosa moist. Poor dentition-missing teeth. Neck:    Supple. Trachea midline. No thyromegaly. No JVD. No bruits. Heart:    Rhythm regular, rate controlled. No murmurs. Sinus bradycardia     Lungs:  Symmetrical. Clear to auscultation bilaterally. No wheezes. No rhonchi. No rales. Abdomen:   Soft. Non-tender. Non-distended. Bowel sounds positive. No organomegaly or masses. No pain on palpation. Extremities:    Peripheral pulses present. No peripheral edema. No ulcers. Neurologic:    Alert and oriented to person. .  Patient does follow commands. Right hand  is slightly less than the left. Sensation intact. Right wrist drop. Speech is thick at times and difficult to understand. Skin:    No rashes. Wound Documentation: Wound Care Documentation:  Wound 07/17/18 Back Left red (Active)   Wound Type Wound 7/20/2018  2:25 PM   Dressing/Treatment Xeroform; Other (Comment) 7/20/2018  2:25 PM   Wound Cleansed Rinsed/Irrigated with saline 7/20/2018  2:25 PM   Wound Length (cm) 7 cm 7/20/2018  2:25 PM   Wound Width (cm) 3 cm 7/20/2018  2:25 PM   Wound Depth (cm)  0.1 7/20/2018  2:25 PM   Calculated Wound Size (cm^2) (l*w) 21 cm^2 7/20/2018  2:25 PM   Wound Assessment Painful;Pink 7/20/2018  2:25 PM   Drainage Amount None 7/20/2018  2:25 PM   Hanna-wound Assessment Fragile 7/20/2018  2:25 PM   [place]%Wound Bed 100 7/20/2018  2:25 PM   Number of days: 3     Chronic Hospital Medical Problems:  No past medical history on file. Active Problems:    Syncope and collapse    Right wrist drop    Moderate protein-calorie malnutrition ([address]  Resolved Problems:    * No resolved hospital problems. *      Assessment:  1. Rhabdomyolysis  2. Acute confusion, unknown origin  3. Sinus bradycardia  4. Encephalopathy with underlying malnutrition, history of alcohol abuse and rhabdomyolysis  5. Mechanical fall  6. Moderate to severe protein calorie malnutrition  7. Mild pulmonary hypertension  8. Anemia  9. Right wrist drop    Plan:   PMR was consult it however per nursing staff they are not going to see the patient as an inpatient but stated that they would follow once discharged. Neurology was consulted, evaluated the patient and recommendations were reviewed. Feels the patient has right wrist drop and less likely stroke. Patient will need outpatient EMG. As patient is a poor historian and no family has been present to discuss it is leading to the patient's hospitalization it is possible that with the patient's fall that he may have landed on the affected arm post fall in time on the affected side is unclear. We'll also obtained heavy metal screen for further evaluation. PT/OT to continue to evaluate. Activity as tolerated. Echocardiogram results were reviewed. Cardiology is following the patient and as per progress note review patient will likely require pacemaker implantation. Patient's medications were reviewed/continued/adjusted. Labs as ordered. Please see orders for further plan of care. We'll have the dietitian evaluate the patient secondary to poor nutritional status. Dietary supplementation as ordered.     More than 50% of my  time was spent counseling and/or coordination of care with the patient and/or family with face to face contact. Time was spent reviewing notes and laboratory data, instructing and counseling the patient  regarding my findings and recommendations and reviewing and answer questions. Caleb Jade DO, F.A.C.OEnmaI.   On 7/20/2018  3:17 PM   3:17 PM

## 2018-07-21 LAB
ALBUMIN SERPL-MCNC: 2.5 G/DL (ref 3.5–5.2)
ALP BLD-CCNC: 77 U/L (ref 40–129)
ALT SERPL-CCNC: 17 U/L (ref 0–40)
ANION GAP SERPL CALCULATED.3IONS-SCNC: 8 MMOL/L (ref 7–16)
AST SERPL-CCNC: 26 U/L (ref 0–39)
BILIRUB SERPL-MCNC: 0.4 MG/DL (ref 0–1.2)
BUN BLDV-MCNC: 7 MG/DL (ref 8–23)
CALCIUM SERPL-MCNC: 7.9 MG/DL (ref 8.6–10.2)
CHLORIDE BLD-SCNC: 102 MMOL/L (ref 98–107)
CO2: 31 MMOL/L (ref 22–29)
CREAT SERPL-MCNC: 0.7 MG/DL (ref 0.7–1.2)
GFR AFRICAN AMERICAN: >60
GFR NON-AFRICAN AMERICAN: >60 ML/MIN/1.73
GLUCOSE BLD-MCNC: 90 MG/DL (ref 74–109)
HCT VFR BLD CALC: 32.7 % (ref 37–54)
HEMOGLOBIN: 10.8 G/DL (ref 12.5–16.5)
MCH RBC QN AUTO: 30.6 PG (ref 26–35)
MCHC RBC AUTO-ENTMCNC: 33 % (ref 32–34.5)
MCV RBC AUTO: 92.6 FL (ref 80–99.9)
PDW BLD-RTO: 12.7 FL (ref 11.5–15)
PLATELET # BLD: 223 E9/L (ref 130–450)
PMV BLD AUTO: 11.5 FL (ref 7–12)
POTASSIUM SERPL-SCNC: 3.5 MMOL/L (ref 3.5–5)
RBC # BLD: 3.53 E12/L (ref 3.8–5.8)
SODIUM BLD-SCNC: 141 MMOL/L (ref 132–146)
TOTAL PROTEIN: 5.8 G/DL (ref 6.4–8.3)
WBC # BLD: 7 E9/L (ref 4.5–11.5)

## 2018-07-21 PROCEDURE — 2580000003 HC RX 258: Performed by: INTERNAL MEDICINE

## 2018-07-21 PROCEDURE — 83825 ASSAY OF MERCURY: CPT

## 2018-07-21 PROCEDURE — 6370000000 HC RX 637 (ALT 250 FOR IP): Performed by: INTERNAL MEDICINE

## 2018-07-21 PROCEDURE — 82175 ASSAY OF ARSENIC: CPT

## 2018-07-21 PROCEDURE — 1200000000 HC SEMI PRIVATE

## 2018-07-21 PROCEDURE — 36415 COLL VENOUS BLD VENIPUNCTURE: CPT

## 2018-07-21 PROCEDURE — 85027 COMPLETE CBC AUTOMATED: CPT

## 2018-07-21 PROCEDURE — 80053 COMPREHEN METABOLIC PANEL: CPT

## 2018-07-21 PROCEDURE — 6360000002 HC RX W HCPCS: Performed by: EMERGENCY MEDICINE

## 2018-07-21 PROCEDURE — 97530 THERAPEUTIC ACTIVITIES: CPT

## 2018-07-21 PROCEDURE — 83655 ASSAY OF LEAD: CPT

## 2018-07-21 RX ORDER — CLOPIDOGREL BISULFATE 75 MG/1
75 TABLET ORAL DAILY
Status: DISCONTINUED | OUTPATIENT
Start: 2018-07-21 | End: 2018-07-23 | Stop reason: HOSPADM

## 2018-07-21 RX ADMIN — CLOPIDOGREL BISULFATE 75 MG: 75 TABLET ORAL at 20:30

## 2018-07-21 RX ADMIN — Medication 100 MG: at 09:43

## 2018-07-21 RX ADMIN — SODIUM CHLORIDE: 9 INJECTION, SOLUTION INTRAVENOUS at 15:01

## 2018-07-21 RX ADMIN — FOLIC ACID 1 MG: 1 TABLET ORAL at 09:43

## 2018-07-21 RX ADMIN — ENOXAPARIN SODIUM 40 MG: 40 INJECTION, SOLUTION INTRAVENOUS; SUBCUTANEOUS at 17:59

## 2018-07-21 RX ADMIN — Medication 10 ML: at 20:31

## 2018-07-21 ASSESSMENT — PAIN SCALES - GENERAL
PAINLEVEL_OUTOF10: 0
PAINLEVEL_OUTOF10: 0

## 2018-07-21 NOTE — CONSULTS
Consult completed 7/20/18 as below. Nutrition Assessment    Type and Reason for Visit: Reassess, Consult    Nutrition Recommendations: Continue current diet with consistency as Speech recommends, continue current ONS    Malnutrition Assessment:  · Malnutrition Status: Meets the criteria for moderate malnutrition  · Context: Acute illness or injury  · Findings of the 6 clinical characteristics of malnutrition (Minimum of 2 out of 6 clinical characteristics is required to make the diagnosis of moderate or severe Protein Calorie Malnutrition based on AND/ASPEN Guidelines):  1. Energy Intake-Less than or equal to 50%,  (x 3 days)    2. Weight Loss-No significant weight loss,    3. Fat Loss-Mild subcutaneous fat loss, Orbital  4. Muscle Loss-Mild muscle mass loss, Clavicles (pectoralis and deltoids), Temples (temporalis muscle)  5. Fluid Accumulation-No significant fluid accumulation,    6.  Strength- (Good for L hand, fair for R hand)    Nutrition Diagnosis:   · Problem:  Moderate malnutrition, in context of acute illness or injury  · Etiology: related to Acute injury/trauma, Difficulty swallowing, Cognitive or neurological impairment     Signs and symptoms:  as evidenced by Intake 25-50%, Swallow study results (Mild loss of subcutaneous fat, mild muscle loss)    Nutrition Assessment:  · Nutrition-Focused Physical Findings: oriented to person/place/time, abdomen WDL, edema WDL, +I/O  · Wound Type: Multiple, Skin Tears, Wound Consult Pending (Redness, abrasions, blisters, bruise, scab, skin tear noted)  · Current Nutrition Therapies:  · Oral Diet Orders: General, Dysphagia 3, Nectar Thick   · Oral Diet intake: 26-50%  · Oral Nutrition Supplement (ONS) Orders: Standard High Calorie Oral Supplement (4 times a day)  · ONS intake: Unable to assess (not recorded at time of assessment)  · Anthropometric Measures:  · Ht: 6' (182.9 cm)   · Current Body Wt: 140 lb (63.5 kg) (7/18/18 bed)  · Admission Body Wt: 140 lb (63.5 kg) (7/17 no method)  · Usual Body Wt: 133 lb (60.3 kg) (8/6/17 per EMR)  · % Weight Change: 5% gain,  11 months  · Ideal Body Wt: 178 lb (80.7 kg), % Ideal Body 79%  · BMI Classification: BMI 18.5 - 24.9 Normal Weight  · Comparative Standards (Estimated Nutrition Needs):  · Estimated Daily Total Kcal: 3063-1909 (x 1.2-1.3 SF)  · Estimated Daily Protein (g): 75-85    Estimated Intake vs Estimated Needs: Intake Less Than Needs    Nutrition Risk Level: Moderate    Nutrition Interventions:   Continue current diet, Continue current ONS  Continued Inpatient Monitoring, Speech Therapy, Swallow Evaluation, Education Not Indicated, Coordination of Care    Nutrition Evaluation:   · Evaluation: Progressing toward goals   · Goals: Consume 75%+ diet/ONS    · Monitoring: Meal Intake, Supplement Intake, Diet Tolerance, Skin Integrity, Fluid Balance, Wound Healing, Mental Status/Confusion, Weight, Comparative Standards, Pertinent Labs, Chewing/Swallowing    See Adult Nutrition Doc Flowsheet for more detail.      Electronically signed by Vickie Hatfield RD, LD on 7/21/18 at 8:15 AM    Contact Number: [phone]

## 2018-07-21 NOTE — PROGRESS NOTES
periventricular white matter most notable in the frontal lobes. There is associated encephalomalacia seen involving bilateral frontal lobes. VENTRICLES IN MIDLINE STRUCTURES: The midline structures appear normal. The ventricles, sulci and cisterns are normal in size and configuration. EXTRA-AXIAL SPACES: The extra-axial spaces appear normal with no mass lesions or fluid collections. ENHANCEMENT: Contrast was not administered. ISCHEMIA: No evidence of restricted diffusion identified. VASCULAR: Flow voids are symmetric bilaterally. CALVARIUM: There is normal bone marrow signal present. EXTRACRANIAL STRUCTURES: The orbits are visualized without abnormality. The visualized paranasal sinuses and mastoid air cells are clear. No evidence of ischemia, hemorrhage or mass. Findings of encephalomalacia in the frontal lobes may be related to previous insult or traumatic injury. Us Carotid Artery Bilateral    Result Date: 7/17/2018  Location:200 Exam: US CAROTID ARTERY BILATERAL Comparison: None. History: Altered mental status Findings: Grayscale, duplex Doppler, color-flow and spectral analysis sonography of the cervical arterial vessels. Mild atherosclerotic irregularity at the common carotid artery bifurcations. Peak systolic velocity proximal MO  93 cm/second and in the LICA 930 cm/second with systolic ratios of 0.8 and  1.0 respectively. Antegrade flow in the vertebral arteries. 1.  Sonographic estimate of less than 50% stenosis proximal MO. 2.  Sonographic estimate of less than 50% stenosis proximal LICA. 3.  Antegrade flow in the vertebral arteries. Physical Exam:  No intake/output data recorded. Intake/Output Summary (Last 24 hours) at 07/21/18 1716  Last data filed at 07/21/18 1421   Gross per 24 hour   Intake           1418.5 ml   Output              350 ml   Net           1068.5 ml   I/O last 3 completed shifts: In: 1418.5 [P.O.:300;  I.V.:1118.5]  Out: 450 [Urine:450]  Patient Vitals for

## 2018-07-22 PROBLEM — M62.82 RHABDOMYOLYSIS: Status: ACTIVE | Noted: 2018-07-22

## 2018-07-22 LAB
ALBUMIN SERPL-MCNC: 2.5 G/DL (ref 3.5–5.2)
ALP BLD-CCNC: 78 U/L (ref 40–129)
ALT SERPL-CCNC: 17 U/L (ref 0–40)
ANION GAP SERPL CALCULATED.3IONS-SCNC: 7 MMOL/L (ref 7–16)
AST SERPL-CCNC: 23 U/L (ref 0–39)
BILIRUB SERPL-MCNC: 0.5 MG/DL (ref 0–1.2)
BUN BLDV-MCNC: 9 MG/DL (ref 8–23)
CALCIUM SERPL-MCNC: 8.3 MG/DL (ref 8.6–10.2)
CHLORIDE BLD-SCNC: 99 MMOL/L (ref 98–107)
CO2: 33 MMOL/L (ref 22–29)
CREAT SERPL-MCNC: 0.8 MG/DL (ref 0.7–1.2)
GFR AFRICAN AMERICAN: >60
GFR NON-AFRICAN AMERICAN: >60 ML/MIN/1.73
GLUCOSE BLD-MCNC: 86 MG/DL (ref 74–109)
HCT VFR BLD CALC: 31.8 % (ref 37–54)
HEMOGLOBIN: 10.5 G/DL (ref 12.5–16.5)
MCH RBC QN AUTO: 31 PG (ref 26–35)
MCHC RBC AUTO-ENTMCNC: 33 % (ref 32–34.5)
MCV RBC AUTO: 93.8 FL (ref 80–99.9)
PDW BLD-RTO: 12.8 FL (ref 11.5–15)
PLATELET # BLD: 229 E9/L (ref 130–450)
PMV BLD AUTO: 11.7 FL (ref 7–12)
POTASSIUM SERPL-SCNC: 3.5 MMOL/L (ref 3.5–5)
RBC # BLD: 3.39 E12/L (ref 3.8–5.8)
SODIUM BLD-SCNC: 139 MMOL/L (ref 132–146)
TOTAL PROTEIN: 6 G/DL (ref 6.4–8.3)
WBC # BLD: 7.5 E9/L (ref 4.5–11.5)

## 2018-07-22 PROCEDURE — 80053 COMPREHEN METABOLIC PANEL: CPT

## 2018-07-22 PROCEDURE — 85027 COMPLETE CBC AUTOMATED: CPT

## 2018-07-22 PROCEDURE — 97530 THERAPEUTIC ACTIVITIES: CPT

## 2018-07-22 PROCEDURE — 1200000000 HC SEMI PRIVATE

## 2018-07-22 PROCEDURE — 2580000003 HC RX 258: Performed by: INTERNAL MEDICINE

## 2018-07-22 PROCEDURE — 97116 GAIT TRAINING THERAPY: CPT

## 2018-07-22 PROCEDURE — 6370000000 HC RX 637 (ALT 250 FOR IP): Performed by: INTERNAL MEDICINE

## 2018-07-22 PROCEDURE — 36415 COLL VENOUS BLD VENIPUNCTURE: CPT

## 2018-07-22 PROCEDURE — 6360000002 HC RX W HCPCS: Performed by: EMERGENCY MEDICINE

## 2018-07-22 RX ADMIN — Medication 100 MG: at 08:40

## 2018-07-22 RX ADMIN — CLOPIDOGREL BISULFATE 75 MG: 75 TABLET ORAL at 08:40

## 2018-07-22 RX ADMIN — Medication 10 ML: at 08:40

## 2018-07-22 RX ADMIN — Medication 10 ML: at 20:43

## 2018-07-22 RX ADMIN — FOLIC ACID 1 MG: 1 TABLET ORAL at 08:40

## 2018-07-22 RX ADMIN — ENOXAPARIN SODIUM 40 MG: 40 INJECTION, SOLUTION INTRAVENOUS; SUBCUTANEOUS at 18:02

## 2018-07-22 ASSESSMENT — PAIN SCALES - GENERAL
PAINLEVEL_OUTOF10: 0

## 2018-07-22 NOTE — PROGRESS NOTES
chair. Comment: Pt sitting in chair at end of treatment, alarm activated. Call light left by patient. A: Pt required increased assist for all functional mobility. Pt con't to be at risk of falls d/t unsteady, narrow JOSH, and poor motor control. Pt confused and required frequent cues for safety and to stay on task. Pt requires one step directions. P: Continue with physical therapy   Laura Martin, PTA    Goals to be met in 3 days. Bed mobility-Minimal assists    Transfers-Minimal assists    Ambulation-Minimal assists for 40 feet using  wheeled walker vs cane pending right hand/wrist      Increase strength in affected muscle groups by 1/3 grade  Increase balance to allow for improvement towards functional goals. Increase endurance to allow for improvement towards functional goals.

## 2018-07-22 NOTE — PROGRESS NOTES
Internal Medicine Progress Note    Judy Motley. Whit Clark., & [address] [facility] [address] Whit Clark., F.A.C.O.I. Noah Sol D.O., F.YOHANNES.C.O.I. Primary Care Physician: No primary care provider on file. Admitting Physician:  Mylinda Cooks, DO  Admission date and time: 7/17/2018  7:21 AM    Room:  [address] [place] [address]  Admitting diagnosis: Syncope and collapse [R55]    Patient Name: Dre Tillman  MRN: [id number]    Date of Service: 7/22/2018     Subjective:    Trinh Hernandez is a 78 y. o.  male who was seen and examined today,7/22/2018, at the bedside. Patient is a poor historian. He will state that he is in [address] but then will ask you to take him to the back of the apartment. He is pleasant today and will follow commands. No family present during my examination. I reviewed the patient's past medical, surgical history and medication. I reviewed the  course of events since last visit. Review of Systems:  Constitutional:   Negative for fever and chills. Fatigued. HEENT:   Negative for ear pain, sore throat, rhinorrhea and sinus pressure. Negative for eye pain, discharge and redness. Psch:   Denies depression or anxiety. Cardiovascular:   Denies any chest pain, irregular heartbeats, or palpitations. Respiratory:   Denies shortness of breath, coughing, sputum production, hemoptysis, or wheezing. Gastrointestinal:   Denies nausea, vomiting, diarrhea, or constipation. Denies any abdominal pain. Extremities:   Denies any lower extremity swelling or edema. Neurology:    Denies any headache or focal neurological deficits. Generalized weakness. Weakness of the right upper extremity-inability to lift independently, and wrist drop. Says he can move his fingers a little easier today. Derm:   Denies any rashes, ulcers, or excoriations.   Patient states he has bruises and crusted areas on his body secondary to his recent fall  Genitourinary:    Denies any urgency, the subcortical and periventricular white matter most notable in the frontal lobes. There is associated encephalomalacia seen involving bilateral frontal lobes. VENTRICLES IN MIDLINE STRUCTURES: The midline structures appear normal. The ventricles, sulci and cisterns are normal in size and configuration. EXTRA-AXIAL SPACES: The extra-axial spaces appear normal with no mass lesions or fluid collections. ENHANCEMENT: Contrast was not administered. ISCHEMIA: No evidence of restricted diffusion identified. VASCULAR: Flow voids are symmetric bilaterally. CALVARIUM: There is normal bone marrow signal present. EXTRACRANIAL STRUCTURES: The orbits are visualized without abnormality. The visualized paranasal sinuses and mastoid air cells are clear. No evidence of ischemia, hemorrhage or mass. Findings of encephalomalacia in the frontal lobes may be related to previous insult or traumatic injury. Us Carotid Artery Bilateral    Result Date: 7/17/2018  Location:200 Exam: US CAROTID ARTERY BILATERAL Comparison: None. History: Altered mental status Findings: Grayscale, duplex Doppler, color-flow and spectral analysis sonography of the cervical arterial vessels. Mild atherosclerotic irregularity at the common carotid artery bifurcations. Peak systolic velocity proximal MO  93 cm/second and in the LICA 110 cm/second with systolic ratios of 0.8 and  1.0 respectively. Antegrade flow in the vertebral arteries. 1.  Sonographic estimate of less than 50% stenosis proximal MO. 2.  Sonographic estimate of less than 50% stenosis proximal LICA. 3.  Antegrade flow in the vertebral arteries. Physical Exam:  No intake/output data recorded. Intake/Output Summary (Last 24 hours) at 07/22/18 1310  Last data filed at 07/22/18 0548   Gross per 24 hour   Intake              550 ml   Output              425 ml   Net              125 ml   I/O last 3 completed shifts:   In: 56 [P.O.:460; I.V.:150]  Out: 425 7/20/2018  2:25 PM   Number of days: 3     Chronic Hospital Medical Problems:  No past medical history on file. Active Problems:    Syncope and collapse    Right wrist drop    Moderate protein-calorie malnutrition ([address]  Resolved Problems:    * No resolved hospital problems. *      Assessment:  1. Rhabdomyolysis  2. Acute confusion, unknown origin  3. Sinus bradycardia  4. Encephalopathy with underlying malnutrition, history of alcohol abuse and rhabdomyolysis  5. Mechanical fall  6. Moderate to severe protein calorie malnutrition  7. Mild pulmonary hypertension  8. Anemia  9. Right wrist drop  10. Likely brachial plexus injury    Plan:   The patient is pleasant and cooperative today. No family present. Await heavy metal screen for further evaluation. PT/OT to continue to evaluate. Activity as tolerated. Cardiology is following the patient and recommendations were reviewed. Patient's medications were reviewed/continued/adjusted. Labs as ordered. Please see orders for further plan of care. Patient has been accepted to [facility] and plan is to discharge tomorrow if stable. Staff reports that the patient has not had further incontinent of stool and is asking to go to the bathroom. Patient will need outpatient EMG.  for discharge planning. More than 50% of my  time was spent counseling and/or coordination of care with the patient and/or family with face to face contact. Time was spent reviewing notes and laboratory data, instructing and counseling the patient  regarding my findings and recommendations and reviewing and answer questions. Caleb Jade DO, F.A.CEnmaO.I.   On 7/22/2018  1:10 PM   1:10 PM

## 2018-07-23 VITALS
BODY MASS INDEX: 18.96 KG/M2 | RESPIRATION RATE: 18 BRPM | OXYGEN SATURATION: 96 % | HEART RATE: 74 BPM | TEMPERATURE: 97.8 F | SYSTOLIC BLOOD PRESSURE: 161 MMHG | WEIGHT: 140 LBS | DIASTOLIC BLOOD PRESSURE: 87 MMHG | HEIGHT: 72 IN

## 2018-07-23 LAB
ALBUMIN SERPL-MCNC: 2.5 G/DL (ref 3.5–5.2)
ALP BLD-CCNC: 71 U/L (ref 40–129)
ALT SERPL-CCNC: 14 U/L (ref 0–40)
ANION GAP SERPL CALCULATED.3IONS-SCNC: 5 MMOL/L (ref 7–16)
AST SERPL-CCNC: 18 U/L (ref 0–39)
BILIRUB SERPL-MCNC: 0.3 MG/DL (ref 0–1.2)
BUN BLDV-MCNC: 16 MG/DL (ref 8–23)
CALCIUM SERPL-MCNC: 8.4 MG/DL (ref 8.6–10.2)
CHLORIDE BLD-SCNC: 100 MMOL/L (ref 98–107)
CO2: 35 MMOL/L (ref 22–29)
CREAT SERPL-MCNC: 0.9 MG/DL (ref 0.7–1.2)
GFR AFRICAN AMERICAN: >60
GFR NON-AFRICAN AMERICAN: >60 ML/MIN/1.73
GLUCOSE BLD-MCNC: 87 MG/DL (ref 74–109)
HCT VFR BLD CALC: 30.1 % (ref 37–54)
HEMOGLOBIN: 9.8 G/DL (ref 12.5–16.5)
MCH RBC QN AUTO: 30.7 PG (ref 26–35)
MCHC RBC AUTO-ENTMCNC: 32.6 % (ref 32–34.5)
MCV RBC AUTO: 94.4 FL (ref 80–99.9)
PDW BLD-RTO: 13 FL (ref 11.5–15)
PLATELET # BLD: 255 E9/L (ref 130–450)
PMV BLD AUTO: 11.7 FL (ref 7–12)
POTASSIUM SERPL-SCNC: 3.6 MMOL/L (ref 3.5–5)
RBC # BLD: 3.19 E12/L (ref 3.8–5.8)
SODIUM BLD-SCNC: 140 MMOL/L (ref 132–146)
TOTAL PROTEIN: 5.8 G/DL (ref 6.4–8.3)
WBC # BLD: 8 E9/L (ref 4.5–11.5)

## 2018-07-23 PROCEDURE — 2580000003 HC RX 258: Performed by: INTERNAL MEDICINE

## 2018-07-23 PROCEDURE — 6370000000 HC RX 637 (ALT 250 FOR IP): Performed by: INTERNAL MEDICINE

## 2018-07-23 PROCEDURE — 97116 GAIT TRAINING THERAPY: CPT

## 2018-07-23 PROCEDURE — 6360000002 HC RX W HCPCS: Performed by: EMERGENCY MEDICINE

## 2018-07-23 PROCEDURE — 97110 THERAPEUTIC EXERCISES: CPT

## 2018-07-23 PROCEDURE — 80053 COMPREHEN METABOLIC PANEL: CPT

## 2018-07-23 PROCEDURE — 85027 COMPLETE CBC AUTOMATED: CPT

## 2018-07-23 PROCEDURE — 36415 COLL VENOUS BLD VENIPUNCTURE: CPT

## 2018-07-23 RX ORDER — FOLIC ACID 1 MG/1
1 TABLET ORAL DAILY
Qty: 30 TABLET | Refills: 3 | Status: SHIPPED | OUTPATIENT
Start: 2018-07-24

## 2018-07-23 RX ORDER — LANOLIN ALCOHOL/MO/W.PET/CERES
100 CREAM (GRAM) TOPICAL DAILY
Qty: 30 TABLET | Refills: 3 | Status: ON HOLD | OUTPATIENT
Start: 2018-07-24 | End: 2018-09-08 | Stop reason: CLARIF

## 2018-07-23 RX ORDER — CLOPIDOGREL BISULFATE 75 MG/1
75 TABLET ORAL DAILY
Qty: 30 TABLET | Refills: 3 | Status: SHIPPED | OUTPATIENT
Start: 2018-07-24

## 2018-07-23 RX ADMIN — ENOXAPARIN SODIUM 40 MG: 40 INJECTION, SOLUTION INTRAVENOUS; SUBCUTANEOUS at 08:25

## 2018-07-23 RX ADMIN — FOLIC ACID 1 MG: 1 TABLET ORAL at 08:24

## 2018-07-23 RX ADMIN — CLOPIDOGREL BISULFATE 75 MG: 75 TABLET ORAL at 08:24

## 2018-07-23 RX ADMIN — Medication 10 ML: at 08:42

## 2018-07-23 RX ADMIN — Medication 100 MG: at 08:24

## 2018-07-23 ASSESSMENT — PAIN SCALES - GENERAL
PAINLEVEL_OUTOF10: 0

## 2018-07-23 NOTE — DISCHARGE SUMMARY
Internal Medicine  Discharge Summary    NAME: Maxi Lr  :  1940  MRN:  [id number]  PCP:No primary care provider on file. ADMITTED: 2018      DISCHARGED: 18    ADMITTING PHYSICIAN: Mariya Jade DO    CONSULTANT(S):   IP CONSULT TO INTERNAL MEDICINE  IP CONSULT TO SOCIAL WORK  IP CONSULT TO CARDIOLOGY  IP CONSULT TO PODIATRY  IP CONSULT TO DIETITIAN  IP CONSULT TO PHYSICAL MEDICINE REHAB  IP CONSULT TO DIETITIAN  IP CONSULT TO NEUROLOGY  IP CONSULT TO DIETITIAN     ADMITTING DIAGNOSIS:   Syncope and collapse [R55]     DISCHARGE DIAGNOSES:   1. Rhabdomyolysis after suffering a fall at home  2. Acute encephalopathy with improvement  3. Moderate protein calorie malnutrition  4. Mild pulmonary hypertension  5. Anemia of chronic disease  6. Right wrist drop    BRIEF HISTORY OF PRESENT ILLNESS:   Maxi Lr is a 78 y.o. male with no past medical history who presents to [facility] complaining of fatigue, weakness, and a questionable fall or syncopal episode. Patient is a poor historian and confused to conversation. He is unable to recall any events of the last few days. Family is unavailable at this time. Patient states that he lives with his brother and his wife and nursing staff report that his ex-wife is present at the bedside earlier. Per EMR and the ED staff, family reported patient had not been eating or drinking for the last couple weeks and was last seen at 11 PM last night and found this morning on the floor. Patient was conscious when found this morning however cannot recall how he got to the floor or how long he had been laying there. Patient has no recorded medical history and states that he does not see a doctor in a regular basis patient cannot recall the last time he saw a physician and states that he only sees them for \"little things like my teeth\". Patient does report some alcohol use however states he drinks only occasionally and none in the last month.   He tissues. Intact cervical vertebral height. Multilevel disc and facet joint narrowing with spurring again noted. 6 mm anterolisthesis C6-7, cyst similar to prior. No fracture. 1. No fracture. 2. Multilevel spondylosis. Mri Cervical Spine Wo Contrast    Result Date: 7/19/2018  Location: 200 Indication: Right arm pain. Comparison: CT cervical spine from 7/17/2018; MRI cervical spine from 10/18/2010. Technique: Multiplanar, multisequence MR imaging of the cervical spine was performed without administration of intravenous contrast. FINDINGS: There is normal cervical lordosis. There is mild chronic loss of superior vertebral body height at C7. No acute fracture is identified. There is grade 1 anterolisthesis of C6 on C7. There are few scattered small hemangiomas. There is no paravertebral soft tissue edema. The cervical cord is normal in caliber and signal. Visualized portion of the posterior fossa is unremarkable. There is multilevel intervertebral disc desiccation. C2-C3: Minimal disc osteophyte complex. No central canal or foraminal narrowing. C3-C4: Mild disc osteophyte complex. Bilateral uncovertebral and facet hypertrophy. Mild central canal narrowing. Severe left and moderate right foraminal narrowing. C4-C5: Mild disc osteophyte complex which effaces the ventral thecal sac. Bilateral facet hypertrophy. Mild central canal and moderate right foraminal narrowing. No left foraminal narrowing. C5-C6: Broad disc osteophyte complex which effaces the ventral thecal sac. Bilateral uncovertebral hypertrophy. Mild central canal and bilateral foraminal narrowing. C6-C7: Grade 1 anterolisthesis. No central canal or foraminal narrowing. C7-T1: Minimal disc osteophyte complex. No central canal or foraminal narrowing. There is no significant disc herniation or central canal narrowing within the visualized portion of the upper thoracic spine.      1. Multilevel cervical spondylosis as described above, most pronounced at C3-C4, where there is a disc osteophyte complex with uncovertebral and facet hypertrophy resulting in severe left and moderate right foraminal narrowing. 2. Moderate right foraminal narrowing at C4-C5 and mild bilateral foraminal narrowing at C5-C6. 3. Mild central canal narrowing from C3-C4 to C5-C6. 4. Cervical cord is normal in signal.    Mri Lumbar Spine Wo Contrast    Result Date: 7/19/2018  Reading location: 200 Indication: Cauda equina syndrome with urinary retention, fecal incontinence, sagittal anesthesia. Comparison: None available. Technique: Multiplanar, multisequence MR imaging of the lumbar spine was performed without administration of intravenous contrast. FINDINGS: For purposes of this study, the last fully formed disc is considered L5-S1 and corresponds to axial image 28 of series 7. There is normal lumbar lordosis. Vertebral bodies maintain normal height. There is trace anterolisthesis of L4 on L5. No acute fracture is identified. There are few scattered hemangiomas. Conus medullaris terminates at T12-L1 and is normal in caliber and signal. There is multilevel intervertebral disc desiccation. T12-L1: No disc herniation. No central canal or foraminal narrowing. L1-L2: No disc herniation. No central canal or foraminal narrowing. L2-L3: Diffuse disc bulge with superimposed left paracentral disc protrusion. Mild bilateral facet hypertrophy. No central canal or foraminal narrowing. L3-L4: Mild disc bulge. Mild bilateral facet hypertrophy. No central canal narrowing. Mild bilateral foraminal narrowing. L4-L5: Trace anterolisthesis with mild diffuse disc bulge. Severe bilateral facet hypertrophy. No central canal narrowing. Mild bilateral subarticular recess and foraminal narrowing. L5-S1: Mild diffuse disc bulge. Mild bilateral facet hypertrophy. No central canal or foraminal narrowing. Visualized paravertebral soft tissues are grossly unremarkable.      1. Multilevel lumbar spondylosis as described color-flow and spectral analysis sonography of the cervical arterial vessels. Mild atherosclerotic irregularity at the common carotid artery bifurcations. Peak systolic velocity proximal MO  93 cm/second and in the LICA 459 cm/second with systolic ratios of 0.8 and  1.0 respectively. Antegrade flow in the vertebral arteries. 1.  Sonographic estimate of less than 50% stenosis proximal MO. 2.  Sonographic estimate of less than 50% stenosis proximal LICA. 3.  Antegrade flow in the vertebral arteries. HOSPITAL COURSE:   Roseann Mar presented to [facility] emergency department after suffering a fall at home. Complete workup was undertaken and the patient's confusion and weakness did improve to a certain extent. He was evaluated by multiple subspecialists and acute rehabilitation was recommended. IV fluid resuscitation was completed. Lab work and vital signs returned to baseline. He did well with therapy teams and was deemed acceptable for discharge to [facility] today. BRIEF PHYSICAL EXAMINATION AND LABORATORIES ON DAY OF DISCHARGE:  VITALS:  BP (!) 161/87   Pulse 74   Temp 97.8 °F (36.6 °C) (Oral)   Resp 18   Ht 6' (1.829 m)   Wt 140 lb (63.5 kg)   SpO2 96%   BMI 18.99 kg/m²     HEENT:  PERRLA. EOMI. Sclera clear. Buccal mucosa moist.    Neck:  Supple. Trachea midline. No thyromegaly. No JVD. No bruits. Heart:  Rhythm regular, rate controlled. No murmurs. Lungs:  Symmetrical. Clear to auscultation bilaterally. No wheezes. No rhonchi. No rales. Abdomen: Soft. Non-tender. Non-distended. Bowel sounds positive. No organomegaly or masses. No pain on palpation    Extremities:  Peripheral pulses present. No peripheral edema. No ulcers. Neurologic:  Alert x 3. No focal deficit. Radial nerve palsy. Skin:  No petechia. No hemorrhage. No wounds. DISPOSITION:  The patient's condition is fair.   At this time the patient is without objective evidence of an acute process requiring continuing hospitalization or inpatient management. They are stable for discharge with outpatient follow-up. I have spoken with the patient and discussed the results of the current hospitalization, in addition to providing specific details for the plan of care and counseling regarding the diagnosis and prognosis. The plan has been discussed in detail and they are aware of the specific conditions for emergent return, as well as the importance of follow-up. Their questions are answered at this time and they are agreeable with the plan for discharge to [address] [place] [address] Ave:   There are no discharge medications for this patient. FOLLOW UP/INSTRUCTIONS:  · This patient is instructed to follow-up with his primary care physician. · Patient is instructed to follow-up with the consults listed above as directed by them. · he is instructed to resume home medications and take new medications as indicated in the list above. · If the patient has a recurrence of symptoms, he is instructed to go to the ED. Preparing for this patient's discharge, including paperwork, orders, instructions, and meeting with patient did require > 30 minutes.     Timothy Pérez DO     7/23/2018  11:39 AM

## 2018-07-23 NOTE — CARE COORDINATION
Ss note:7/23/2018. 1:15 PM discharge order noted. Arranged physician ambulance transfer to [facility] today at 3:00 pm. Facility, nursing and family aware.  NIEVES Laura

## 2018-07-24 ENCOUNTER — APPOINTMENT (OUTPATIENT)
Dept: CT IMAGING | Age: 78
End: 2018-07-24
Payer: MEDICARE

## 2018-07-24 ENCOUNTER — HOSPITAL ENCOUNTER (EMERGENCY)
Age: 78
Discharge: HOME | End: 2018-07-24
Attending: EMERGENCY MEDICINE
Payer: MEDICARE

## 2018-07-24 VITALS
TEMPERATURE: 98.4 F | DIASTOLIC BLOOD PRESSURE: 58 MMHG | SYSTOLIC BLOOD PRESSURE: 142 MMHG | HEART RATE: 75 BPM | RESPIRATION RATE: 18 BRPM | OXYGEN SATURATION: 97 %

## 2018-07-24 DIAGNOSIS — S09.90XA CLOSED HEAD INJURY, INITIAL ENCOUNTER: ICD-10-CM

## 2018-07-24 DIAGNOSIS — S00.03XA CONTUSION OF SCALP, INITIAL ENCOUNTER: Primary | ICD-10-CM
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
## 2018-07-24 LAB
ANION GAP SERPL CALCULATED.3IONS-SCNC: 7 MMOL/L (ref 7–16)
BASOPHILS ABSOLUTE: 0.03 E9/L (ref 0–0.2)
BASOPHILS RELATIVE PERCENT: 0.4 % (ref 0–2)
BUN BLDV-MCNC: 20 MG/DL (ref 8–23)
CALCIUM SERPL-MCNC: 9 MG/DL (ref 8.6–10.2)
CHLORIDE BLD-SCNC: 97 MMOL/L (ref 98–107)
CO2: 36 MMOL/L (ref 22–29)
CREAT SERPL-MCNC: 0.9 MG/DL (ref 0.7–1.2)
EKG ATRIAL RATE: 75 BPM
EKG P AXIS: 0 DEGREES
EKG P-R INTERVAL: 126 MS
EKG Q-T INTERVAL: 390 MS
EKG QRS DURATION: 84 MS
EKG QTC CALCULATION (BAZETT): 435 MS
EKG R AXIS: 73 DEGREES
EKG T AXIS: 42 DEGREES
EKG VENTRICULAR RATE: 75 BPM
EOSINOPHILS ABSOLUTE: 0.1 E9/L (ref 0.05–0.5)
EOSINOPHILS RELATIVE PERCENT: 1.4 % (ref 0–6)
GFR AFRICAN AMERICAN: >60
GFR NON-AFRICAN AMERICAN: >60 ML/MIN/1.73
GLUCOSE BLD-MCNC: 119 MG/DL (ref 74–109)
HCT VFR BLD CALC: 33 % (ref 37–54)
HEMOGLOBIN: 10.5 G/DL (ref 12.5–16.5)
IMMATURE GRANULOCYTES #: 0.02 E9/L
IMMATURE GRANULOCYTES %: 0.3 % (ref 0–5)
LYMPHOCYTES ABSOLUTE: 0.74 E9/L (ref 1.5–4)
LYMPHOCYTES RELATIVE PERCENT: 10.3 % (ref 20–42)
MCH RBC QN AUTO: 30.6 PG (ref 26–35)
MCHC RBC AUTO-ENTMCNC: 31.8 % (ref 32–34.5)
MCV RBC AUTO: 96.2 FL (ref 80–99.9)
MONOCYTES ABSOLUTE: 0.76 E9/L (ref 0.1–0.95)
MONOCYTES RELATIVE PERCENT: 10.6 % (ref 2–12)
NEUTROPHILS ABSOLUTE: 5.52 E9/L (ref 1.8–7.3)
NEUTROPHILS RELATIVE PERCENT: 77 % (ref 43–80)
PDW BLD-RTO: 13 FL (ref 11.5–15)
PLATELET # BLD: 283 E9/L (ref 130–450)
PMV BLD AUTO: 11.4 FL (ref 7–12)
POTASSIUM SERPL-SCNC: 4.6 MMOL/L (ref 3.5–5)
RBC # BLD: 3.43 E12/L (ref 3.8–5.8)
SODIUM BLD-SCNC: 140 MMOL/L (ref 132–146)
TROPONIN: 0.05 NG/ML (ref 0–0.03)
WBC # BLD: 7.2 E9/L (ref 4.5–11.5)

## 2018-07-24 PROCEDURE — 80048 BASIC METABOLIC PNL TOTAL CA: CPT

## 2018-07-24 PROCEDURE — 85025 COMPLETE CBC W/AUTO DIFF WBC: CPT

## 2018-07-24 PROCEDURE — 84484 ASSAY OF TROPONIN QUANT: CPT

## 2018-07-24 PROCEDURE — 72125 CT NECK SPINE W/O DYE: CPT

## 2018-07-24 PROCEDURE — 93005 ELECTROCARDIOGRAM TRACING: CPT | Performed by: EMERGENCY MEDICINE

## 2018-07-24 PROCEDURE — 99284 EMERGENCY DEPT VISIT MOD MDM: CPT

## 2018-07-24 PROCEDURE — 36415 COLL VENOUS BLD VENIPUNCTURE: CPT

## 2018-07-24 PROCEDURE — 70450 CT HEAD/BRAIN W/O DYE: CPT

## 2018-07-25 NOTE — ED NOTES
2 assist to stand pt in an attempt to use the urinal. It was not successful. Pt changed into clean dry pants and repositioned for comfort in bed. 4X4 secured with paper tape on head Lac. Curtain to the room is open since pt is disoriented.       Pelon Amaya RN  07/24/18 2026

## 2018-07-25 NOTE — ED PROVIDER NOTES
Patient is a 81-year-old male who presents via EMS from a nursing home following a fall. Per nursing home staff the patient just arrived there for rehabilitation after hospital admission. The patient had an unwitnessed fall and suffered a contusion of the forehead. Baseline mentation is confused. He is not complaining of anything, he does not know where he is or why he is there. He does not recall the event. Patient is on Plavix and Lovenox. The history is provided by the EMS personnel and the nursing home. No  was used. Review of Systems   Unable to perform ROS: Dementia       Physical Exam   Constitutional: He appears well-developed and well-nourished. No distress. Patient is confused, not answering questions appropriately. He is recalling the events of [facility] at this time. HENT:   Head: Normocephalic. Head is with contusion. Right Ear: External ear normal.   Left Ear: External ear normal.   Mouth/Throat: Oropharynx is clear and moist. No oropharyngeal exudate. Contusion of the mid forehead, no laceration present. Bleeding controlled. No hemotympanum. No septal hematoma. No neck tenderness to palpation. Eyes: Conjunctivae are normal. Pupils are equal, round, and reactive to light. Right eye exhibits no discharge. Left eye exhibits no discharge. No scleral icterus. Neck: Normal range of motion. Neck supple. Cardiovascular: Normal rate, regular rhythm and normal heart sounds. Exam reveals no gallop. No murmur heard. Pulmonary/Chest: Effort normal and breath sounds normal. No respiratory distress. He has no wheezes. Abdominal: Soft. Bowel sounds are normal. He exhibits no distension. There is no tenderness. Musculoskeletal: Normal range of motion. He exhibits no edema or tenderness. Lymphadenopathy:     He has no cervical adenopathy. Neurological: He is alert. Patient is alert, oriented ×0.  He is recalling the events of [facility] at this time. No focal deficit. He does not answer questions appropirately. Skin: Skin is warm and dry. No rash noted. He is not diaphoretic. No pallor. Psychiatric: He has a normal mood and affect. His behavior is normal.       Procedures    [facility]            --------------------------------------------- PAST HISTORY ---------------------------------------------  Past Medical History:  has a past medical history of Rhabdomyolysis. Past Surgical History:  has no past surgical history on file. Social History:  reports that he has quit smoking. He has never used smokeless tobacco. He reports that he does not drink alcohol or use drugs. Family History: family history is not on file. The patients home medications have been reviewed.     Allergies: Ativan [lorazepam]    -------------------------------------------------- RESULTS -------------------------------------------------  Labs:  Results for orders placed or performed during the hospital encounter of 07/24/18   CBC Auto Differential   Result Value Ref Range    WBC 7.2 4.5 - 11.5 E9/L    RBC 3.43 (L) 3.80 - 5.80 E12/L    Hemoglobin 10.5 (L) 12.5 - 16.5 g/dL    Hematocrit 33.0 (L) 37.0 - 54.0 %    MCV 96.2 80.0 - 99.9 fL    MCH 30.6 26.0 - 35.0 pg    MCHC 31.8 (L) 32.0 - 34.5 %    RDW 13.0 11.5 - 15.0 fL    Platelets 882 175 - 090 E9/L    MPV 11.4 7.0 - 12.0 fL    Neutrophils % 77.0 43.0 - 80.0 %    Immature Granulocytes % 0.3 0.0 - 5.0 %    Lymphocytes % 10.3 (L) 20.0 - 42.0 %    Monocytes % 10.6 2.0 - 12.0 %    Eosinophils % 1.4 0.0 - 6.0 %    Basophils % 0.4 0.0 - 2.0 %    Neutrophils # 5.52 1.80 - 7.30 E9/L    Immature Granulocytes # 0.02 E9/L    Lymphocytes # 0.74 (L) 1.50 - 4.00 E9/L    Monocytes # 0.76 0.10 - 0.95 E9/L    Eosinophils # 0.10 0.05 - 0.50 E9/L    Basophils # 0.03 0.00 - 0.20 B8/K   Basic Metabolic Panel   Result Value Ref Range    Sodium 140 132 - 146 mmol/L    Potassium 4.6 3.5 - 5.0 mmol/L    Chloride 97 (L) 98 - 107 mmol/L    CO2 36 (H) this time the patient is without objective evidence of an acute process requiring hospitalization or inpatient management. They have remained hemodynamically stable throughout their entire ED visit and are stable for discharge with outpatient follow-up. The plan has been discussed in detail and they are aware of the specific conditions for emergent return, as well as the importance of follow-up. MDM:  Patient presented following a fall at the nursing home. Head CT and CT of the C-spine did not show any acute fracture or abnormality. Laboratory work was drawn, his troponin was increased at 0.05, but this is near his baseline 0.03. No EKG changes were found. Patient will be discharged back to the nursing home. New Prescriptions    No medications on file       Diagnosis:  1. Contusion of scalp, initial encounter    2. Closed head injury, initial encounter    3. Fall, initial encounter        Disposition:  Patient's disposition: Discharge to nursing home  Patient's condition is stable.              Matthew Neil DO  Resident  07/24/18 3148

## 2018-07-26 LAB
EKG ATRIAL RATE: 52 BPM
EKG P AXIS: 43 DEGREES
EKG P-R INTERVAL: 138 MS
EKG Q-T INTERVAL: 482 MS
EKG QRS DURATION: 86 MS
EKG QTC CALCULATION (BAZETT): 448 MS
EKG R AXIS: 52 DEGREES
EKG T AXIS: 26 DEGREES
EKG VENTRICULAR RATE: 52 BPM

## 2018-07-27 LAB
ARSENIC BLOOD: <10 UG/L (ref 0–13)
LEAD LEVEL BLOOD: 2 UG/DL (ref 0–4.9)
MERCURY BLOOD: <3 UG/L (ref 0–10)

## 2018-08-17 LAB
EKG ATRIAL RATE: 51 BPM
EKG P AXIS: 43 DEGREES
EKG P-R INTERVAL: 132 MS
EKG Q-T INTERVAL: 544 MS
EKG QRS DURATION: 92 MS
EKG QTC CALCULATION (BAZETT): 501 MS
EKG R AXIS: 69 DEGREES
EKG T AXIS: 51 DEGREES
EKG VENTRICULAR RATE: 51 BPM

## 2018-09-07 ENCOUNTER — APPOINTMENT (OUTPATIENT)
Dept: CT IMAGING | Age: 78
DRG: 070 | End: 2018-09-07
Payer: MEDICARE

## 2018-09-07 ENCOUNTER — APPOINTMENT (OUTPATIENT)
Dept: GENERAL RADIOLOGY | Age: 78
DRG: 070 | End: 2018-09-07
Payer: MEDICARE

## 2018-09-07 ENCOUNTER — HOSPITAL ENCOUNTER (INPATIENT)
Age: 78
LOS: 3 days | Discharge: OTHER INSTITUTION | DRG: 070 | End: 2018-09-11
Attending: EMERGENCY MEDICINE | Admitting: INTERNAL MEDICINE
Payer: MEDICARE

## 2018-09-07 DIAGNOSIS — R53.1 GENERAL WEAKNESS: ICD-10-CM

## 2018-09-07 DIAGNOSIS — R41.82 ALTERED MENTAL STATUS, UNSPECIFIED ALTERED MENTAL STATUS TYPE: Primary | ICD-10-CM

## 2018-09-07 LAB
ALBUMIN SERPL-MCNC: 3.3 G/DL (ref 3.5–5.2)
ALP BLD-CCNC: 99 U/L (ref 40–129)
ALT SERPL-CCNC: 13 U/L (ref 0–40)
AMMONIA: 24 UMOL/L (ref 16–60)
ANION GAP SERPL CALCULATED.3IONS-SCNC: 11 MMOL/L (ref 7–16)
ANISOCYTOSIS: ABNORMAL
APTT: 25.7 SEC (ref 24.5–35.1)
AST SERPL-CCNC: 26 U/L (ref 0–39)
BASOPHILS ABSOLUTE: 0 E9/L (ref 0–0.2)
BASOPHILS RELATIVE PERCENT: 0.2 % (ref 0–2)
BILIRUB SERPL-MCNC: 1.1 MG/DL (ref 0–1.2)
BUN BLDV-MCNC: 28 MG/DL (ref 8–23)
CALCIUM SERPL-MCNC: 8.7 MG/DL (ref 8.6–10.2)
CHLORIDE BLD-SCNC: 105 MMOL/L (ref 98–107)
CHP ED QC CHECK: NORMAL
CO2: 31 MMOL/L (ref 22–29)
CREAT SERPL-MCNC: 0.9 MG/DL (ref 0.7–1.2)
EOSINOPHILS ABSOLUTE: 0 E9/L (ref 0.05–0.5)
EOSINOPHILS RELATIVE PERCENT: 2.3 % (ref 0–6)
GFR AFRICAN AMERICAN: >60
GFR NON-AFRICAN AMERICAN: >60 ML/MIN/1.73
GLUCOSE BLD-MCNC: 101 MG/DL
GLUCOSE BLD-MCNC: 109 MG/DL (ref 74–109)
HCT VFR BLD CALC: 36.8 % (ref 37–54)
HEMOGLOBIN: 11.7 G/DL (ref 12.5–16.5)
INR BLD: 1.3
LACTIC ACID: 1.4 MMOL/L (ref 0.5–2.2)
LYMPHOCYTES ABSOLUTE: 0.35 E9/L (ref 1.5–4)
LYMPHOCYTES RELATIVE PERCENT: 3.5 % (ref 20–42)
MCH RBC QN AUTO: 30.6 PG (ref 26–35)
MCHC RBC AUTO-ENTMCNC: 31.8 % (ref 32–34.5)
MCV RBC AUTO: 96.3 FL (ref 80–99.9)
METER GLUCOSE: 101 MG/DL (ref 70–110)
MONOCYTES ABSOLUTE: 0.35 E9/L (ref 0.1–0.95)
MONOCYTES RELATIVE PERCENT: 3.5 % (ref 2–12)
NEUTROPHILS ABSOLUTE: 8.18 E9/L (ref 1.8–7.3)
NEUTROPHILS RELATIVE PERCENT: 93 % (ref 43–80)
OVALOCYTES: ABNORMAL
PDW BLD-RTO: 13.2 FL (ref 11.5–15)
PLATELET # BLD: 332 E9/L (ref 130–450)
PMV BLD AUTO: 11.3 FL (ref 7–12)
POIKILOCYTES: ABNORMAL
POTASSIUM SERPL-SCNC: 3.7 MMOL/L (ref 3.5–5)
PRO-BNP: 487 PG/ML (ref 0–450)
PROTHROMBIN TIME: 14.5 SEC (ref 9.3–12.4)
RBC # BLD: 3.82 E12/L (ref 3.8–5.8)
SODIUM BLD-SCNC: 147 MMOL/L (ref 132–146)
TOTAL PROTEIN: 7.3 G/DL (ref 6.4–8.3)
TROPONIN: 0.02 NG/ML (ref 0–0.03)
WBC # BLD: 8.8 E9/L (ref 4.5–11.5)

## 2018-09-07 PROCEDURE — 82140 ASSAY OF AMMONIA: CPT

## 2018-09-07 PROCEDURE — 84484 ASSAY OF TROPONIN QUANT: CPT

## 2018-09-07 PROCEDURE — 99285 EMERGENCY DEPT VISIT HI MDM: CPT

## 2018-09-07 PROCEDURE — 85025 COMPLETE CBC W/AUTO DIFF WBC: CPT

## 2018-09-07 PROCEDURE — 83605 ASSAY OF LACTIC ACID: CPT

## 2018-09-07 PROCEDURE — 82550 ASSAY OF CK (CPK): CPT

## 2018-09-07 PROCEDURE — 82962 GLUCOSE BLOOD TEST: CPT

## 2018-09-07 PROCEDURE — 85610 PROTHROMBIN TIME: CPT

## 2018-09-07 PROCEDURE — 71045 X-RAY EXAM CHEST 1 VIEW: CPT

## 2018-09-07 PROCEDURE — 70450 CT HEAD/BRAIN W/O DYE: CPT

## 2018-09-07 PROCEDURE — 85730 THROMBOPLASTIN TIME PARTIAL: CPT

## 2018-09-07 PROCEDURE — 36415 COLL VENOUS BLD VENIPUNCTURE: CPT

## 2018-09-07 PROCEDURE — 80053 COMPREHEN METABOLIC PANEL: CPT

## 2018-09-07 PROCEDURE — 87040 BLOOD CULTURE FOR BACTERIA: CPT

## 2018-09-07 PROCEDURE — 83880 ASSAY OF NATRIURETIC PEPTIDE: CPT

## 2018-09-07 ASSESSMENT — ENCOUNTER SYMPTOMS
EYE REDNESS: 0
NAUSEA: 0
VOMITING: 0
WHEEZING: 0
SHORTNESS OF BREATH: 0
SORE THROAT: 0
BACK PAIN: 0
SINUS PRESSURE: 0
EYE DISCHARGE: 0
ABDOMINAL PAIN: 0
DIARRHEA: 0
COUGH: 0
EYE PAIN: 0

## 2018-09-08 ENCOUNTER — APPOINTMENT (OUTPATIENT)
Dept: GENERAL RADIOLOGY | Age: 78
DRG: 070 | End: 2018-09-08
Payer: MEDICARE

## 2018-09-08 PROBLEM — G93.41 ACUTE METABOLIC ENCEPHALOPATHY: Status: ACTIVE | Noted: 2018-09-08

## 2018-09-08 LAB
AMORPHOUS: NORMAL
ANION GAP SERPL CALCULATED.3IONS-SCNC: 9 MMOL/L (ref 7–16)
B.E.: 3.4 MMOL/L (ref -3–3)
BACTERIA: ABNORMAL /HPF
BACTERIA: NORMAL /HPF
BILIRUBIN URINE: ABNORMAL
BILIRUBIN URINE: ABNORMAL
BLOOD, URINE: ABNORMAL
BLOOD, URINE: NEGATIVE
BUN BLDV-MCNC: 25 MG/DL (ref 8–23)
CALCIUM SERPL-MCNC: 8.4 MG/DL (ref 8.6–10.2)
CHLORIDE BLD-SCNC: 110 MMOL/L (ref 98–107)
CLARITY: ABNORMAL
CLARITY: CLEAR
CO2: 30 MMOL/L (ref 22–29)
COHB: 0.9 % (ref 0–1.5)
COLOR: ABNORMAL
COLOR: YELLOW
CREAT SERPL-MCNC: 1 MG/DL (ref 0.7–1.2)
CRITICAL: ABNORMAL
DATE ANALYZED: ABNORMAL
DATE OF COLLECTION: ABNORMAL
EPITHELIAL CELLS, UA: ABNORMAL /HPF
EPITHELIAL CELLS, UA: NORMAL /HPF
FOLATE: >20 NG/ML (ref 4.8–24.2)
GFR AFRICAN AMERICAN: >60
GFR NON-AFRICAN AMERICAN: >60 ML/MIN/1.73
GLUCOSE BLD-MCNC: 98 MG/DL (ref 74–109)
GLUCOSE URINE: 100 MG/DL
GLUCOSE URINE: NEGATIVE MG/DL
HCO3: 28.2 MMOL/L (ref 22–26)
HCT VFR BLD CALC: 34.8 % (ref 37–54)
HEMOGLOBIN: 11 G/DL (ref 12.5–16.5)
HHB: 4.8 % (ref 0–5)
KETONES, URINE: 15 MG/DL
KETONES, URINE: 15 MG/DL
LAB: ABNORMAL
LEUKOCYTE ESTERASE, URINE: NEGATIVE
LEUKOCYTE ESTERASE, URINE: NEGATIVE
Lab: 405
MAGNESIUM: 2.6 MG/DL (ref 1.6–2.6)
MCH RBC QN AUTO: 30.5 PG (ref 26–35)
MCHC RBC AUTO-ENTMCNC: 31.6 % (ref 32–34.5)
MCV RBC AUTO: 96.4 FL (ref 80–99.9)
METHB: 0 % (ref 0–1.5)
MODE: ABNORMAL
NITRITE, URINE: NEGATIVE
NITRITE, URINE: NEGATIVE
O2 CONTENT: 16.5 ML/DL
O2 SATURATION: 95.2 % (ref 92–98.5)
O2HB: 94.3 % (ref 94–97)
OPERATOR ID: 9
PATIENT TEMP: 37 C
PCO2: 43.9 MMHG (ref 35–45)
PDW BLD-RTO: 13.3 FL (ref 11.5–15)
PH BLOOD GAS: 7.43 (ref 7.35–7.45)
PH UA: 5.5 (ref 5–9)
PH UA: 5.5 (ref 5–9)
PHOSPHORUS: 3.3 MG/DL (ref 2.5–4.5)
PLATELET # BLD: 320 E9/L (ref 130–450)
PMV BLD AUTO: 11.5 FL (ref 7–12)
PO2: 76.1 MMHG (ref 60–100)
POTASSIUM SERPL-SCNC: 4 MMOL/L (ref 3.5–5)
PROTEIN UA: 30 MG/DL
PROTEIN UA: 30 MG/DL
RBC # BLD: 3.61 E12/L (ref 3.8–5.8)
RBC UA: ABNORMAL /HPF (ref 0–2)
RBC UA: NORMAL /HPF (ref 0–2)
SODIUM BLD-SCNC: 149 MMOL/L (ref 132–146)
SOURCE, BLOOD GAS: ABNORMAL
SPECIFIC GRAVITY UA: 1.02 (ref 1–1.03)
SPECIFIC GRAVITY UA: 1.02 (ref 1–1.03)
THB: 12.4 G/DL (ref 11.5–16.5)
TIME ANALYZED: 414
TOTAL CK: 361 U/L (ref 20–200)
TROPONIN: 0.02 NG/ML (ref 0–0.03)
TSH SERPL DL<=0.05 MIU/L-ACNC: 3.76 UIU/ML (ref 0.27–4.2)
UROBILINOGEN, URINE: 2 E.U./DL
UROBILINOGEN, URINE: 4 E.U./DL
VITAMIN B-12: 625 PG/ML (ref 211–946)
WBC # BLD: 8.2 E9/L (ref 4.5–11.5)
WBC UA: ABNORMAL /HPF (ref 0–5)
WBC UA: NORMAL /HPF (ref 0–5)

## 2018-09-08 PROCEDURE — 2500000003 HC RX 250 WO HCPCS: Performed by: RADIOLOGY

## 2018-09-08 PROCEDURE — 84484 ASSAY OF TROPONIN QUANT: CPT

## 2018-09-08 PROCEDURE — 1200000000 HC SEMI PRIVATE

## 2018-09-08 PROCEDURE — 84100 ASSAY OF PHOSPHORUS: CPT

## 2018-09-08 PROCEDURE — 36415 COLL VENOUS BLD VENIPUNCTURE: CPT

## 2018-09-08 PROCEDURE — 36600 WITHDRAWAL OF ARTERIAL BLOOD: CPT

## 2018-09-08 PROCEDURE — 80048 BASIC METABOLIC PNL TOTAL CA: CPT

## 2018-09-08 PROCEDURE — 85027 COMPLETE CBC AUTOMATED: CPT

## 2018-09-08 PROCEDURE — 83735 ASSAY OF MAGNESIUM: CPT

## 2018-09-08 PROCEDURE — 82607 VITAMIN B-12: CPT

## 2018-09-08 PROCEDURE — 84443 ASSAY THYROID STIM HORMONE: CPT

## 2018-09-08 PROCEDURE — 82746 ASSAY OF FOLIC ACID SERUM: CPT

## 2018-09-08 PROCEDURE — 6370000000 HC RX 637 (ALT 250 FOR IP): Performed by: INTERNAL MEDICINE

## 2018-09-08 PROCEDURE — 92610 EVALUATE SWALLOWING FUNCTION: CPT

## 2018-09-08 PROCEDURE — 81001 URINALYSIS AUTO W/SCOPE: CPT

## 2018-09-08 PROCEDURE — 6360000002 HC RX W HCPCS: Performed by: INTERNAL MEDICINE

## 2018-09-08 PROCEDURE — 87088 URINE BACTERIA CULTURE: CPT

## 2018-09-08 PROCEDURE — 92611 MOTION FLUOROSCOPY/SWALLOW: CPT

## 2018-09-08 PROCEDURE — 2580000003 HC RX 258: Performed by: INTERNAL MEDICINE

## 2018-09-08 PROCEDURE — 92526 ORAL FUNCTION THERAPY: CPT

## 2018-09-08 PROCEDURE — 82805 BLOOD GASES W/O2 SATURATION: CPT

## 2018-09-08 PROCEDURE — 74230 X-RAY XM SWLNG FUNCJ C+: CPT

## 2018-09-08 RX ORDER — SODIUM CHLORIDE 0.9 % (FLUSH) 0.9 %
10 SYRINGE (ML) INJECTION PRN
Status: DISCONTINUED | OUTPATIENT
Start: 2018-09-08 | End: 2018-09-11 | Stop reason: HOSPADM

## 2018-09-08 RX ORDER — SODIUM CHLORIDE 0.9 % (FLUSH) 0.9 %
10 SYRINGE (ML) INJECTION EVERY 12 HOURS SCHEDULED
Status: DISCONTINUED | OUTPATIENT
Start: 2018-09-08 | End: 2018-09-11 | Stop reason: HOSPADM

## 2018-09-08 RX ORDER — DEXTROSE MONOHYDRATE 25 G/50ML
12.5 INJECTION, SOLUTION INTRAVENOUS PRN
Status: DISCONTINUED | OUTPATIENT
Start: 2018-09-08 | End: 2018-09-11 | Stop reason: HOSPADM

## 2018-09-08 RX ORDER — DEXTROSE MONOHYDRATE 50 MG/ML
100 INJECTION, SOLUTION INTRAVENOUS PRN
Status: DISCONTINUED | OUTPATIENT
Start: 2018-09-08 | End: 2018-09-11 | Stop reason: HOSPADM

## 2018-09-08 RX ORDER — SODIUM CHLORIDE AND POTASSIUM CHLORIDE .9; .15 G/100ML; G/100ML
SOLUTION INTRAVENOUS CONTINUOUS
Status: DISCONTINUED | OUTPATIENT
Start: 2018-09-08 | End: 2018-09-08

## 2018-09-08 RX ORDER — ACETAMINOPHEN 325 MG/1
650 TABLET ORAL EVERY 4 HOURS PRN
Status: DISCONTINUED | OUTPATIENT
Start: 2018-09-08 | End: 2018-09-11 | Stop reason: HOSPADM

## 2018-09-08 RX ORDER — DEXTROSE AND SODIUM CHLORIDE 5; .45 G/100ML; G/100ML
INJECTION, SOLUTION INTRAVENOUS CONTINUOUS
Status: DISCONTINUED | OUTPATIENT
Start: 2018-09-08 | End: 2018-09-11 | Stop reason: HOSPADM

## 2018-09-08 RX ORDER — FOLIC ACID 1 MG/1
1 TABLET ORAL DAILY
Status: DISCONTINUED | OUTPATIENT
Start: 2018-09-08 | End: 2018-09-11 | Stop reason: HOSPADM

## 2018-09-08 RX ORDER — THIAMINE MONONITRATE (VIT B1) 100 MG
100 TABLET ORAL DAILY
Status: DISCONTINUED | OUTPATIENT
Start: 2018-09-08 | End: 2018-09-11 | Stop reason: HOSPADM

## 2018-09-08 RX ORDER — NICOTINE POLACRILEX 4 MG
15 LOZENGE BUCCAL PRN
Status: DISCONTINUED | OUTPATIENT
Start: 2018-09-08 | End: 2018-09-11 | Stop reason: HOSPADM

## 2018-09-08 RX ORDER — CLOPIDOGREL BISULFATE 75 MG/1
75 TABLET ORAL DAILY
Status: DISCONTINUED | OUTPATIENT
Start: 2018-09-08 | End: 2018-09-11 | Stop reason: HOSPADM

## 2018-09-08 RX ADMIN — FOLIC ACID 1 MG: 1 TABLET ORAL at 15:41

## 2018-09-08 RX ADMIN — BARIUM SULFATE 45 G: 0.6 CREAM ORAL at 14:40

## 2018-09-08 RX ADMIN — Medication 100 MG: at 15:40

## 2018-09-08 RX ADMIN — ACETAMINOPHEN 650 MG: 325 TABLET, FILM COATED ORAL at 22:53

## 2018-09-08 RX ADMIN — POTASSIUM CHLORIDE AND SODIUM CHLORIDE: 900; 150 INJECTION, SOLUTION INTRAVENOUS at 03:29

## 2018-09-08 RX ADMIN — CLOPIDOGREL BISULFATE 75 MG: 75 TABLET ORAL at 15:40

## 2018-09-08 RX ADMIN — BARIUM SULFATE 88 G: 960 POWDER, FOR SUSPENSION ORAL at 14:40

## 2018-09-08 RX ADMIN — DEXTROSE AND SODIUM CHLORIDE: 5; 450 INJECTION, SOLUTION INTRAVENOUS at 16:05

## 2018-09-08 ASSESSMENT — PAIN SCALES - GENERAL
PAINLEVEL_OUTOF10: 0
PAINLEVEL_OUTOF10: 10

## 2018-09-08 NOTE — PROGRESS NOTES
Speech Language Pathology  Speech Pathologist (SLP) completed education with the patient/family regarding type of swallowing impairment. Reviewed current solid/liquid consistency diet recommendations of NPO pending MBSS. Reviewed aspiration precautions. Encouraged patient and/or family to engage SLP in unstructured Q&A session relative to identified deficit areas; indicated understanding of all information provided via satisfactory verbal response. Pt presents with a consistent wet cough in response to thin liquids and an inconsistent and latent throat clear to NTL & PTL. RN present throughout bedside swallow evaluation and d/t consistent s/sx aspiration-SLP rec NPO pending MBSS. RN to obtain order from physician. See report for full details. Tereso Ramon M.Ed. ,[address] [place] [address] [phone]

## 2018-09-08 NOTE — PROGRESS NOTES
facility/treating Speech Pathologist                     CURRENT RESPIRATORY STATUS:  [x]  Room Air   []  Nasal cannula [] O2 mask  []  Non-rebreather mask  []  BiPAP     []  Tracheostomy  []  Vent dependent    CURRENT NUTRITIONAL STATUS:    [x]  Oral  []  NPO    COGNITIVE STATUS:  []  Intact  []  Confused []  Aphasic  [x]  Cognitive deficits     ORAL PERIPHERAL STATUS:     []  WFL     []  Adequate lingual/labial strength  [x]  Generalized oral weakness  []  Right labiobuccal weakness   []  Left labiobuccal weakness    []  Right lingual deviation    []  Left lingual deviation   []  Decreased velar elevation              []  Decreased lingual coordination               []  Oral apraxia     []  Gag response present     []  Gag response absent   []  Volitional cough   []  Volitional swallow             []  CNT    Dentition: []  Natural  [x]  Missing teeth  []  Edentulous  []  Partial  []  Other    Vocal quality prior to PO intake:  [x]  WFL  []  Weak  []  Wet    PROCEDURE     Consistencies Administered During the Evaluation   Liquids: [x]  Thin    [x]  Nectar   []  Honey   Solids:  [x]  Pureed/Pudding   []  Soft Solid   []  Cookie   Other:      Method of Intake:   [x]  Cup  [x]  Spoon  [x]  Straw  []  Self Fed  [x]  Fed by Clinician     Position:   [x]  Upright seated  []  Upright Standing  []  Supine, elevated 45°     []  Utilized Modified Blue Dye  tracheostomy protocol      RESULTS     Oral Stage:   []  Normal   []  Functional  [x]  Abnormal     [x]  Dentition: ([]  natural  [x]  missing teeth  []  edentulous  []  partials  []  other )    []  Inadequate labial seal resulting anterior labial spillage from ([] right  [] left  [] midline )    []  Decreased mastication due to ([]  poor/missing dentition  []  decreased lingual control  []  vertical munch  []  other)     [x]  Delayed A-P transit due to ([]  decreased initiation   []  poor tongue movement   [x]  cognitive function )    []  Oral residuals []  right buccal cavity  []  left buccal cavity []  sub lingually      []  on tongue base   []  throughout oral cavity     []  on superior tongue       []  on palate               []  on velum              []  anterior sulcus    Comments:      Pharyngeal Stage:  []  Normal   []  Functional      [x]  Abnormal     []  No signs of aspiration were noted during this evaluation however, silent aspiration cannot be ruled out at bedside. If silent aspiration is suspected, a Videofluoroscopic Study of Swallowing (MBS) is recommended and requires a physician order. [x]  Throat clearing present after presentation of ([]  thin  [x]  nectar  []  honey  [x]  pureed  []  solid)    [x]  Immediate/latent  wet cough noted after presentation of ([x]  thin  []  nectar  []  honey  []  pureed  []  solid)    []  Wet respirations/vocal quality noted after presentation of ([]  thin  []  nectar  []  honey  []  pureed  []  solid)    [] Multiple swallows noted after presentation of ([]  thin  []  nectar  []  honey  []  pureed  [] solid)     []  Consistent O2  desaturation after the swallow    []  Eye watering/flushing of skin    []  Congested cough throughout evaluation    []  Absent swallow    With utilization of modified blue dye tracheostomy protocol     []   Blue dye was present in secretions upon suctioning     []  Blue dye was absent from secretions upon suctioning                          [x]  Evaluation results discussed with [x]patient   [] family. [x]  Prognosis for improvements is fair  [x]  This plan will be re-evaluated and revised in 1 week  if warranted. [x]  Patient stated goals: drink water   [x]  Treatment goals discussed with []  patient/  []  family. [x]  The [x]  patient/ []  family presents with decreased understanding of the diagnosis, prognosis and plan of care. EDUCATION  Speech Pathologist (SLP) completed education with the patient/family regarding type of swallowing impairment.  Reviewed current solid/liquid consistency diet recommendations of NPO until MBSS can be completed. Reviewed aspiration precautions. Encouraged patient and/or family to engage SLP in unstructured Q&A session relative to identified deficit areas; indicated understanding of all information provided via satisfactory verbal response. [x]The admitting diagnosis and active problem list, as listed below have been reviewed prior to initiation of this evaluation. ADMITTING DIAGNOSIS: Acute metabolic encephalopathy [Y38.61]     ACTIVE PROBLEM LIST:   Patient Active Problem List   Diagnosis    Syncope and collapse    Right wrist drop    Moderate protein-calorie malnutrition (HCC)    Rhabdomyolysis    Acute metabolic encephalopathy         Garcia NIETO.Ed. [address] [id number]

## 2018-09-08 NOTE — ED NOTES
Dr. Issa [facility] requested I ambulate pt. This RN could get hardly get pt. Into an upright position without him falling over. Requested pt. Move his R.  Leg. Pt. Could not.   This was relayed to dr. Eduarda Schneider RN  09/08/18 4126

## 2018-09-08 NOTE — H&P
pulmonary hypertension  5. Anemia of chronic disease  6. Right wrist drop        Labs:  Lab Results   Component Value Date    WBC 8.8 09/07/2018    HGB 11.7 (L) 09/07/2018    HCT 36.8 (L) 09/07/2018     09/07/2018     (H) 09/07/2018    K 3.7 09/07/2018     09/07/2018    CREATININE 0.9 09/07/2018    BUN 28 (H) 09/07/2018    CO2 31 (H) 09/07/2018    GLUCOSE 101 09/07/2018    ALT 13 09/07/2018    AST 26 09/07/2018    INR 1.3 09/07/2018     Lab Results   Component Value Date    CKTOTAL 361 (H) 09/07/2018    CKMB 13.5 (H) 07/17/2018    TROPONINI 0.02 09/07/2018     No results for input(s): BNP in the last 72 hours. Radiology:  Ct Head Wo Contrast    Result Date: 9/7/2018  Location:200 Exam: CT HEAD WO CONTRAST Comparison: 7/24/2018 History: Altered mental status Findings: Noncontrast images were obtained through the brain parenchyma. Automated dose control. The ventricles are midline and nondilated. No acute abnormality of the brain parenchyma, intracranial hemorrhage, mass effect or large extra-axial fluid collection. Cortical atrophy and bifrontal encephalomalacia     No CT evidence of acute intracranial abnormality. Xr Chest Portable    Result Date: 9/7/2018  Location:200 Exam: XR CHEST PORTABLE Comparison: 7/17/2018 History: Altered mental status Findings: Portable AP upright chest. Heart size is normal. Pulmonary vessels are nondistended. No focal pulmonary parenchymal consolidation. No acute cardiopulmonary disease. Review of Systems: limited due to altered mental status  All bolded are positive, all others are negative. General:  Fever, chills, diaphoresis, fatigue, malaise, night sweats, weight loss  Psychological:  Anxiety, disorientation, hallucinations. ENT:  Epistaxis, headaches, vertigo, visual changes. Cardiovascular:  Chest pain, irregular heartbeats, palpitations, paroxysmal nocturnal dyspnea.   Respiratory:  Shortness of breath, coughing, sputum production, only, cachectic   Head: Normocephalic, atraumatic  Eyes: Conjunctivae/corneas clear, Sclera non icteric  Mouth: Mucous membranes moist  Neck: No JVD, no adenopathy,  neck is supple, trachea is midline  Back: ROM normal  Lungs: diminished breath sounds bilaterally  Heart: Regular rate and regular rhythm, no murmur, normal S1, S2  Abdomen: Soft, non-tender; bowel sounds normal; no masses, no organomegaly  Extremities: No lower extremity edema, extremities atraumatic, no cyanosis, no clubbing, 2+ pedal pulses palpated  Skin: multiple bruises and scabs over upper extremities  Neurologic: Normal muscle tone throughout, , EOMI, face symmetric, equal facial muscle strength bilaterally, hearing intact, tongue midline, Speech         Assessment and Plan     Patient is a 78 y.o. male who presented with altered mental status. Assessment/Plan  1. Acute metabolic encephalopathy--- no clear etiology at this point. Partially due to lack of oral intake and activity at home. 2. Failure to thrive  3. Dehydration  4. Generalized weakness  5. Ambulatory dysfunction  6. Elevated troponin  7. Hx of Sinus bradycardia  8. Moderate protein calorie malnutrition  9. Mild pulmonary hypertension  10. Anemia of chronic disease  11. Right wrist drop      Attempt made to contact family however number disconnected. Place on IV fluids at 50 cc/hr. Consult PT/OT. Consult social work for placement. lovenox for dvt prophylaxis. See orders for further plan of care. This patient was discussed with Dr. Chuck Villanueva. Daniel Porras DO, PGY2  1:54 AM  9/8/2018       I performed a history and physical examination of the patient and discussed the patient's management with the resident. I reviewed the resident's note and agree with the documented findings and plan of care.     Guillermo Hauser D.O., FACOI  7:02 AM  9/8/2018

## 2018-09-08 NOTE — ED NOTES
Bed: 02  Expected date:   Expected time:   Means of arrival:   Comments:  DERICK Pineda, HARISH  09/07/18 5810

## 2018-09-08 NOTE — PROGRESS NOTES
Speech Language Pathology  SPEECH/LANGUAGE PATHOLOGY  VIDEOFLUOROSCOPIC STUDY OF SWALLOWING (MBS)      SUMMARY OF EVALUATION     DYSPHAGIA DIAGNOSIS:   Moderate-marked oropharyngeal dysphagia      [] Malnutrition indicators have been identified and nursing has been notified to ensure a dietary consult is ordered. DIET RECOMMENDATIONS:    []   NPO  Nothing by mouth including oral meds. Consider   []  NG tube  []  PEG tube     []  Regular consistency foods with _consistency liquids     []   Dental soft with _consistency liquids  []  Dysphagia 3 (advanced) with _consistency liquids   []  Dysphagia 2 (mechanically altered) with_consistency liquids   [x]  Dysphagia 1 (pureed) with honey consistency liquids   []  Initiate trial feeding under the Speech Pathologists supervision   []  Other:         FEEDING RECOMMENDATIONS:   []No assistance required    []Stand by assist   []Full assistance required   []Set up required   [x]Supervision with all PO intake    []Double swallow    []Chin tuck  []Multiple swallow     [x]Feed in upright position   [x]Small bites/sips   []Feed reclined 45 degrees_  []Alternate solids / liquids  []Check for oral pocketing  []No straw    []Throat clear       []Place food on left side  []Place food on right side  []Spoon sip liquids   []Effortful swallow  []Gonzales water protocol  []Modified Eyvonne Horn water protocol     [x]Administer meds (crushed) with: puree texture  []Administer meds via feeding tube  []Other       THERAPY RECOMMENDATIONS:      []Therapy is not recommended      [x]Therapy is recommended to:    []Improve oral motor strength and range of motion    [x]Improve tongue base retraction    [x]Improve laryngeal strength and range of motion    []Address cricopharyngeal dysfunction (Shaker Exercises)    [x]Provide Deep Pharyngeal Neuromuscular Stimulation (DPNS)   []Repeat swallow study in    []Other:    []Therapy at the discretion of facility/treating Speech Pathologist satisfactory verbal response. [x]The admitting diagnosis and active problem list, as listed below have been reviewed prior to initiation of this evaluation. ADMITTING DIAGNOSIS: Acute metabolic encephalopathy [Z87.52]     ACTIVE PROBLEM LIST:   Patient Active Problem List   Diagnosis    Syncope and collapse    Right wrist drop    Moderate protein-calorie malnutrition (HCC)    Rhabdomyolysis    Acute metabolic encephalopathy       Axel GRANTEd. [address] [id number]

## 2018-09-08 NOTE — ED NOTES
[facility] on 4th floor notified that patients MEWS score is 2 at this time. Patient is ready to be transferred to floor at this time.      Sarah Samayoa RN  09/08/18 0262

## 2018-09-08 NOTE — CARE COORDINATION
SS Note/Discharge plan:  Consult received for \"placement\", PT/OT ordered and evals pending, pt has Medicare primary and will need a 3 day qualifying hospital stay prior to rehab placement if recommended, no precert required, pt currently confused and alert to person only, no family present in room, sw will attempt to contact family concerning consult and follow for transition of care needs and to confirm d/c plan. Electronically signed by NIEVES Aguilar on 9/8/2018 at 12:05 PM

## 2018-09-08 NOTE — PROGRESS NOTES
cm 9/8/2018  3:54 AM   Wound Depth (cm)  0 9/8/2018  3:54 AM   Calculated Wound Size (cm^2) (l*w) 3.57 cm^2 9/8/2018  3:54 AM   Number of days: 0       Chronic Hospital Medical Problems:  Past Medical History:   Diagnosis Date    Rhabdomyolysis      Active Problems:    Acute metabolic encephalopathy  Resolved Problems:    * No resolved hospital problems. *      Assessment:  1. Acute metabolic encephalopathy--- no clear etiology at this point however poor dietary intake/poor nutritional status is contributory. 2. Failure to thrive  3. Dehydration  4. Generalized weakness  5. Ambulatory dysfunction  6. Elevated troponin  7. Hx of Sinus bradycardia  8. Moderate to severe protein calorie malnutrition  9. Mild pulmonary hypertension  10. Anemia of chronic disease  11. Right wrist drop      Plan:   CT revealed no evidence of acute intracranial abnormality. Cortical atrophy and bifrontal encephalomalacia. No family present. Will continue IV hydration. Speech therapy to evaluate for aspiration.  for discharge planning likely need SNF placement. Await culture results. PT/OT to evaluate and treat. Patient's medications were reviewed/continued/adjusted. Labs as ordered. Please see orders for further plan of care. Staff reported that the patient's son contact information yields a disconnected number. Staff is attempting to find other contact numbers for patient. Time was spent reviewing notes and laboratory data. Caleb Jade DO, F.A.C.O.I.   On 9/8/2018  2:46 PM

## 2018-09-08 NOTE — ED PROVIDER NOTES
Chief complain:  Altered mental status    HPI history provided by report and patient  Patient sent in from home for altered mental status. Apparently recently got home from an extended-care facility and has become increasingly confused with weakness and fatigue, inability to ambulate which she had been doing before. On arrival the patient is a cachectic, elderly male in no acute distress. He is awake, hard of hearing. Denies headache, chest pain, palpitations or shortness of breath and denies abdominal pain. States he is not sure why he is here. States that he feels fine. Denies nausea. Review of Systems   Constitutional: Negative for chills and fever. HENT: Negative for ear pain, sinus pressure and sore throat. Eyes: Negative for pain, discharge and redness. Respiratory: Negative for cough, shortness of breath and wheezing. Cardiovascular: Negative for chest pain and palpitations. Gastrointestinal: Negative for abdominal pain, diarrhea, nausea and vomiting. Genitourinary: Negative for dysuria, flank pain and frequency. Musculoskeletal: Negative for arthralgias and back pain. Skin: Negative for rash and wound. Neurological: Positive for weakness. Negative for dizziness and headaches. Hematological: Negative for adenopathy. All other systems reviewed and are negative. review of systems Limited secondary to patient's complaint of altered mental status. Physical Exam   Constitutional: He appears well-developed. He appears cachectic. Non-toxic appearance. He does not have a sickly appearance. He does not appear ill. No distress. Cachectic, frail, dehydrated appearing male in no acute distress. HENT:   Head: Normocephalic and atraumatic. Head is without raccoon's eyes, without Bridges's sign, without abrasion, without contusion, without laceration, without right periorbital erythema and without left periorbital erythema. Eyes: Pupils are equal, round, and reactive to light.  No scleral icterus. Neck: Normal range of motion. Neck supple. Cardiovascular: Normal rate, regular rhythm and normal heart sounds. No murmur heard. Pulmonary/Chest: Effort normal and breath sounds normal. No accessory muscle usage. No tachypnea. No respiratory distress. He has no decreased breath sounds. He has no wheezes. He has no rhonchi. He has no rales. He exhibits no tenderness. Abdominal: Soft. Normal appearance and bowel sounds are normal. He exhibits no distension, no ascites, no pulsatile midline mass and no mass. There is no tenderness. There is no rigidity, no rebound, no guarding, no CVA tenderness, no tenderness at McBurney's point and negative Rosales's sign. Musculoskeletal: He exhibits no edema or tenderness. No pretibial edema or calf pain. Does have some old-appearing bruises to the right lower leg laterally with no associated tenderness or swelling or new-appearing bruising. He has no joint effusions. Neurological: He is alert. He is disoriented. He displays atrophy. He displays no tremor and normal reflexes. No cranial nerve deficit or sensory deficit. He exhibits normal muscle tone. He displays no seizure activity. GCS eye subscore is 4. GCS verbal subscore is 4. GCS motor subscore is 6. Oriented to person only but is conversant otherwise, is hard of hearing. Clear speech, no facial droop. Moves all extremities to command. No focal neurologic deficit. Skin: Skin is warm and dry. He is not diaphoretic. Nursing note and vitals reviewed. Procedures    MDM    ED Course as of Sep 08 0147   Sat Sep 08, 2018   0146 1:46 AM  Case discussed with Dr. Nick Diaz, detailed overview given, he will admit the patient.   [NC]      ED Course User Index  [NC] Jorge Platt DO       EKG Interpretation    Interpreted by emergency department physician    Rhythm: normal sinus   Rate: 74  Axis: normal  Ectopy: premature atrial contraction  Conduction: normal  ST Segments: no acute change  T - 5.0 mmol/L    Chloride 105 98 - 107 mmol/L    CO2 31 (H) 22 - 29 mmol/L    Anion Gap 11 7 - 16 mmol/L    Glucose 109 74 - 109 mg/dL    BUN 28 (H) 8 - 23 mg/dL    CREATININE 0.9 0.7 - 1.2 mg/dL    GFR Non-African American >60 >=60 mL/min/1.73    GFR African American >60     Calcium 8.7 8.6 - 10.2 mg/dL    Total Protein 7.3 6.4 - 8.3 g/dL    Alb 3.3 (L) 3.5 - 5.2 g/dL    Total Bilirubin 1.1 0.0 - 1.2 mg/dL    Alkaline Phosphatase 99 40 - 129 U/L    ALT 13 0 - 40 U/L    AST 26 0 - 39 U/L   Protime-INR   Result Value Ref Range    Protime 14.5 (H) 9.3 - 12.4 sec    INR 1.3    APTT   Result Value Ref Range    aPTT 25.7 24.5 - 35.1 sec   Brain Natriuretic Peptide   Result Value Ref Range    Pro- (H) 0 - 450 pg/mL   Lactic Acid, Plasma   Result Value Ref Range    Lactic Acid 1.4 0.5 - 2.2 mmol/L   Ammonia   Result Value Ref Range    Ammonia 24.0 16.0 - 60.0 umol/L   Troponin   Result Value Ref Range    Troponin 0.02 0.00 - 0.03 ng/mL   Urinalysis   Result Value Ref Range    Color, UA Yellow Straw/Yellow    Clarity, UA SLCLOUDY Clear    Glucose, Ur Negative Negative mg/dL    Bilirubin Urine SMALL (A) Negative    Ketones, Urine 15 (A) Negative mg/dL    Specific Gravity, UA 1.025 1.005 - 1.030    Blood, Urine Negative Negative    pH, UA 5.5 5.0 - 9.0    Protein, UA 30 (A) Negative mg/dL    Urobilinogen, Urine 2.0 (A) <2.0 E.U./dL    Nitrite, Urine Negative Negative    Leukocyte Esterase, Urine Negative Negative   Microscopic Urinalysis   Result Value Ref Range    WBC, UA 1-3 0 - 5 /HPF    RBC, UA NONE 0 - 2 /HPF    Epi Cells RARE /HPF    Bacteria, UA NONE /HPF    Amorphous, UA MODERATE    CK   Result Value Ref Range    Total  (H) 20 - 200 U/L   POCT Glucose   Result Value Ref Range    Glucose 101 mg/dL    QC OK?  OK    POCT Glucose   Result Value Ref Range    Meter Glucose 101 70 - 110 mg/dL   EKG 12 Lead   Result Value Ref Range    Ventricular Rate 74 BPM    Atrial Rate 74 BPM    P-R Interval 126 ms    QRS Duration 86 ms    Q-T Interval 428 ms    QTc Calculation (Bazett) 475 ms    P Axis 106 degrees    R Axis 59 degrees    T Axis 15 degrees       RADIOLOGY:  XR CHEST PORTABLE   Final Result   No acute cardiopulmonary disease. CT Head WO Contrast   Final Result   No CT evidence of acute intracranial abnormality.                   ------------------------- NURSING NOTES AND VITALS REVIEWED ---------------------------  Date / Time Roomed:  9/7/2018 10:01 PM  ED Bed Assignment:  02/02    The nursing notes within the ED encounter and vital signs as below have been reviewed. Patient Vitals for the past 24 hrs:   BP Temp Temp src Pulse Resp SpO2 Height Weight   09/08/18 0057 113/73 98.4 °F (36.9 °C) Oral 67 16 98 % - -   09/07/18 2209 136/81 98 °F (36.7 °C) Oral 80 20 98 % 5' 10\" (1.778 m) 113 lb (51.3 kg)       Oxygen Saturation Interpretation: Normal    ------------------------------------------ PROGRESS NOTES ------------------------------------------  Re-evaluation(s):  Time: 0130  Patients symptoms show no change  Repeat physical examination is not changed    Counseling:  I have spoken with the patient and discussed todays results, in addition to providing specific details for the plan of care and counseling regarding the diagnosis and prognosis. Their questions are answered at this time and they are agreeable with the plan of admission.    --------------------------------- ADDITIONAL PROVIDER NOTES ---------------------------------  Consultations:  Time: . Spoke with Dr. Candelaria Randle for Dr. Maryjo Pichardo. Discussed case. They will admit the patient  . This patient's ED course included: a personal history and physicial examination, re-evaluation prior to disposition and multiple bedside re-evaluations    This patient has remained hemodynamically stable during their ED course. Diagnosis:  1. Altered mental status, unspecified altered mental status type    2.  General weakness        Disposition:  Patient's

## 2018-09-09 LAB
ANION GAP SERPL CALCULATED.3IONS-SCNC: 6 MMOL/L (ref 7–16)
BUN BLDV-MCNC: 19 MG/DL (ref 8–23)
CALCIUM SERPL-MCNC: 8.4 MG/DL (ref 8.6–10.2)
CHLORIDE BLD-SCNC: 110 MMOL/L (ref 98–107)
CO2: 30 MMOL/L (ref 22–29)
CREAT SERPL-MCNC: 0.9 MG/DL (ref 0.7–1.2)
GFR AFRICAN AMERICAN: >60
GFR NON-AFRICAN AMERICAN: >60 ML/MIN/1.73
GLUCOSE BLD-MCNC: 94 MG/DL (ref 74–109)
HCT VFR BLD CALC: 35.5 % (ref 37–54)
HEMOGLOBIN: 10.9 G/DL (ref 12.5–16.5)
MCH RBC QN AUTO: 30 PG (ref 26–35)
MCHC RBC AUTO-ENTMCNC: 30.7 % (ref 32–34.5)
MCV RBC AUTO: 97.8 FL (ref 80–99.9)
PDW BLD-RTO: 13.2 FL (ref 11.5–15)
PLATELET # BLD: 314 E9/L (ref 130–450)
PMV BLD AUTO: 11.4 FL (ref 7–12)
POTASSIUM SERPL-SCNC: 4.1 MMOL/L (ref 3.5–5)
RBC # BLD: 3.63 E12/L (ref 3.8–5.8)
SODIUM BLD-SCNC: 146 MMOL/L (ref 132–146)
WBC # BLD: 8.3 E9/L (ref 4.5–11.5)

## 2018-09-09 PROCEDURE — 2580000003 HC RX 258: Performed by: INTERNAL MEDICINE

## 2018-09-09 PROCEDURE — 97161 PT EVAL LOW COMPLEX 20 MIN: CPT | Performed by: PHYSICAL THERAPIST

## 2018-09-09 PROCEDURE — G8978 MOBILITY CURRENT STATUS: HCPCS | Performed by: PHYSICAL THERAPIST

## 2018-09-09 PROCEDURE — 6360000002 HC RX W HCPCS: Performed by: INTERNAL MEDICINE

## 2018-09-09 PROCEDURE — 97530 THERAPEUTIC ACTIVITIES: CPT | Performed by: PHYSICAL THERAPIST

## 2018-09-09 PROCEDURE — G8979 MOBILITY GOAL STATUS: HCPCS | Performed by: PHYSICAL THERAPIST

## 2018-09-09 PROCEDURE — 80048 BASIC METABOLIC PNL TOTAL CA: CPT

## 2018-09-09 PROCEDURE — 85027 COMPLETE CBC AUTOMATED: CPT

## 2018-09-09 PROCEDURE — 6370000000 HC RX 637 (ALT 250 FOR IP): Performed by: INTERNAL MEDICINE

## 2018-09-09 PROCEDURE — 1200000000 HC SEMI PRIVATE

## 2018-09-09 PROCEDURE — 36415 COLL VENOUS BLD VENIPUNCTURE: CPT

## 2018-09-09 RX ORDER — ZIPRASIDONE MESYLATE 20 MG/ML
10 INJECTION, POWDER, LYOPHILIZED, FOR SOLUTION INTRAMUSCULAR ONCE
Status: COMPLETED | OUTPATIENT
Start: 2018-09-09 | End: 2018-09-09

## 2018-09-09 RX ADMIN — DEXTROSE AND SODIUM CHLORIDE: 5; 450 INJECTION, SOLUTION INTRAVENOUS at 18:44

## 2018-09-09 RX ADMIN — CEFTRIAXONE 1 G: 1 INJECTION, POWDER, FOR SOLUTION INTRAMUSCULAR; INTRAVENOUS at 10:52

## 2018-09-09 RX ADMIN — ACETAMINOPHEN 650 MG: 325 TABLET, FILM COATED ORAL at 09:25

## 2018-09-09 RX ADMIN — DEXTROSE AND SODIUM CHLORIDE: 5; 450 INJECTION, SOLUTION INTRAVENOUS at 09:25

## 2018-09-09 RX ADMIN — FOLIC ACID 1 MG: 1 TABLET ORAL at 09:25

## 2018-09-09 RX ADMIN — WATER 1.2 ML: 1 INJECTION INTRAMUSCULAR; INTRAVENOUS; SUBCUTANEOUS at 23:05

## 2018-09-09 RX ADMIN — CLOPIDOGREL BISULFATE 75 MG: 75 TABLET ORAL at 09:25

## 2018-09-09 RX ADMIN — ACETAMINOPHEN 650 MG: 325 TABLET, FILM COATED ORAL at 13:51

## 2018-09-09 RX ADMIN — Medication 100 MG: at 09:25

## 2018-09-09 RX ADMIN — ZIPRASIDONE MESYLATE 10 MG: 20 INJECTION, POWDER, LYOPHILIZED, FOR SOLUTION INTRAMUSCULAR at 23:04

## 2018-09-09 ASSESSMENT — PAIN DESCRIPTION - PAIN TYPE: TYPE: ACUTE PAIN

## 2018-09-09 ASSESSMENT — PAIN DESCRIPTION - DESCRIPTORS: DESCRIPTORS: ACHING

## 2018-09-09 ASSESSMENT — PAIN SCALES - GENERAL
PAINLEVEL_OUTOF10: 0
PAINLEVEL_OUTOF10: 8
PAINLEVEL_OUTOF10: 5
PAINLEVEL_OUTOF10: 10
PAINLEVEL_OUTOF10: 0

## 2018-09-09 ASSESSMENT — PAIN DESCRIPTION - ORIENTATION: ORIENTATION: LEFT

## 2018-09-09 ASSESSMENT — PAIN DESCRIPTION - LOCATION: LOCATION: LEG

## 2018-09-09 NOTE — PROGRESS NOTES
cooperative, oriented x 1 and follows one step directions,      ROM: wfl RUE limited unable to extend wrist fingers actively   STRENGTH:3+/5 rue shldr, elbow 2+/5 wrist/finger ext 1/5 flex 2/5  PAIN: (measured on a visual analog scale with 0=no pain and 10=excruciating pain) unable to rate pain    FUNCTIONAL ASSESSMENT   Bed Mobility- Supine to sit- Moderate/max assistance            Scooting-Moderate assistance         Sit to supine-Maximal assistance        Transfers-Sit to stand-Maximal assistance       Gait:  Unable to advance le's  Balance: sit-fair  - min assit for midline      stand poor  Flexed posture    Edema: no  Endurance: fair       Treatment: pt sat eob x 10 min with min assist for balance,   Stood unable to advance le's for gait, returned to bed  with   alarm and call light, nursing notified  Rehab Potential: good     Patients Goal: none stated    ASSESSMENT  Patient exhibits decreased strength, balance, coordination impairing functional mobility. GOALS to be met in 3 days. Bed mobility-  Minimal assists          Transfers-Sit to stand-Minimal assists       Gait:  Patient to ambulate 20 feet using Wheeled Walker with Minimal assists         Increase rom in affected joints by 10%  Increase strength in affected mm groups by 1/3 grade  Increase balance to allow for improvement towards functional goals. Increase endurance to allow for improvement towards functional goals.         Radha Hamilton, PT

## 2018-09-10 ENCOUNTER — APPOINTMENT (OUTPATIENT)
Dept: GENERAL RADIOLOGY | Age: 78
DRG: 070 | End: 2018-09-10
Payer: MEDICARE

## 2018-09-10 PROBLEM — E43 SEVERE PROTEIN-CALORIE MALNUTRITION (HCC): Status: ACTIVE | Noted: 2018-09-10

## 2018-09-10 LAB
ANION GAP SERPL CALCULATED.3IONS-SCNC: 8 MMOL/L (ref 7–16)
BUN BLDV-MCNC: 11 MG/DL (ref 8–23)
CALCIUM SERPL-MCNC: 7.7 MG/DL (ref 8.6–10.2)
CHLORIDE BLD-SCNC: 107 MMOL/L (ref 98–107)
CO2: 26 MMOL/L (ref 22–29)
CREAT SERPL-MCNC: 0.8 MG/DL (ref 0.7–1.2)
GFR AFRICAN AMERICAN: >60
GFR NON-AFRICAN AMERICAN: >60 ML/MIN/1.73
GLUCOSE BLD-MCNC: 108 MG/DL (ref 74–109)
HCT VFR BLD CALC: 30.4 % (ref 37–54)
HEMOGLOBIN: 9.7 G/DL (ref 12.5–16.5)
MCH RBC QN AUTO: 30.7 PG (ref 26–35)
MCHC RBC AUTO-ENTMCNC: 31.9 % (ref 32–34.5)
MCV RBC AUTO: 96.2 FL (ref 80–99.9)
PDW BLD-RTO: 13.2 FL (ref 11.5–15)
PLATELET # BLD: 295 E9/L (ref 130–450)
PMV BLD AUTO: 11.3 FL (ref 7–12)
POTASSIUM SERPL-SCNC: 3.2 MMOL/L (ref 3.5–5)
RBC # BLD: 3.16 E12/L (ref 3.8–5.8)
SODIUM BLD-SCNC: 141 MMOL/L (ref 132–146)
URINE CULTURE, ROUTINE: NORMAL
WBC # BLD: 7.7 E9/L (ref 4.5–11.5)

## 2018-09-10 PROCEDURE — 6370000000 HC RX 637 (ALT 250 FOR IP): Performed by: INTERNAL MEDICINE

## 2018-09-10 PROCEDURE — 74230 X-RAY XM SWLNG FUNCJ C+: CPT

## 2018-09-10 PROCEDURE — 80048 BASIC METABOLIC PNL TOTAL CA: CPT

## 2018-09-10 PROCEDURE — 85027 COMPLETE CBC AUTOMATED: CPT

## 2018-09-10 PROCEDURE — 97530 THERAPEUTIC ACTIVITIES: CPT

## 2018-09-10 PROCEDURE — 92526 ORAL FUNCTION THERAPY: CPT | Performed by: SPEECH-LANGUAGE PATHOLOGIST

## 2018-09-10 PROCEDURE — 92611 MOTION FLUOROSCOPY/SWALLOW: CPT | Performed by: SPEECH-LANGUAGE PATHOLOGIST

## 2018-09-10 PROCEDURE — G8988 SELF CARE GOAL STATUS: HCPCS

## 2018-09-10 PROCEDURE — 71045 X-RAY EXAM CHEST 1 VIEW: CPT

## 2018-09-10 PROCEDURE — 2580000003 HC RX 258: Performed by: INTERNAL MEDICINE

## 2018-09-10 PROCEDURE — 97166 OT EVAL MOD COMPLEX 45 MIN: CPT

## 2018-09-10 PROCEDURE — 97116 GAIT TRAINING THERAPY: CPT

## 2018-09-10 PROCEDURE — 6360000002 HC RX W HCPCS: Performed by: INTERNAL MEDICINE

## 2018-09-10 PROCEDURE — 1200000000 HC SEMI PRIVATE

## 2018-09-10 PROCEDURE — G8987 SELF CARE CURRENT STATUS: HCPCS

## 2018-09-10 PROCEDURE — 36415 COLL VENOUS BLD VENIPUNCTURE: CPT

## 2018-09-10 PROCEDURE — 2500000003 HC RX 250 WO HCPCS: Performed by: INTERNAL MEDICINE

## 2018-09-10 RX ORDER — POTASSIUM CHLORIDE 20 MEQ/1
40 TABLET, EXTENDED RELEASE ORAL ONCE
Status: COMPLETED | OUTPATIENT
Start: 2018-09-10 | End: 2018-09-10

## 2018-09-10 RX ADMIN — BARIUM SULFATE 88 G: 960 POWDER, FOR SUSPENSION ORAL at 14:21

## 2018-09-10 RX ADMIN — DEXTROSE AND SODIUM CHLORIDE: 5; 450 INJECTION, SOLUTION INTRAVENOUS at 05:29

## 2018-09-10 RX ADMIN — DEXTROSE AND SODIUM CHLORIDE: 5; 450 INJECTION, SOLUTION INTRAVENOUS at 16:14

## 2018-09-10 RX ADMIN — BARIUM SULFATE 45 G: 0.6 CREAM ORAL at 14:20

## 2018-09-10 RX ADMIN — POTASSIUM CHLORIDE 40 MEQ: 1500 TABLET, EXTENDED RELEASE ORAL at 11:07

## 2018-09-10 RX ADMIN — CLOPIDOGREL BISULFATE 75 MG: 75 TABLET ORAL at 09:03

## 2018-09-10 RX ADMIN — Medication 100 MG: at 09:03

## 2018-09-10 RX ADMIN — CEFTRIAXONE 1 G: 1 INJECTION, POWDER, FOR SOLUTION INTRAMUSCULAR; INTRAVENOUS at 10:00

## 2018-09-10 RX ADMIN — FOLIC ACID 1 MG: 1 TABLET ORAL at 09:03

## 2018-09-10 ASSESSMENT — PAIN SCALES - GENERAL
PAINLEVEL_OUTOF10: 0
PAINLEVEL_OUTOF10: 0

## 2018-09-10 NOTE — CONSULTS
loss,  2 months  · Ideal Body Wt: 166 lb (75.3 kg), % Ideal Body 67%  · BMI Classification: BMI <18.5 Underweight  · Comparative Standards (Estimated Nutrition Needs):  · Estimated Daily Total Kcal: 1368-9071 (x 1.2-1.3 SF)  · Estimated Daily Protein (g): 50-60    Estimated Intake vs Estimated Needs: Intake Less Than Needs    Nutrition Risk Level: High    Nutrition Interventions:   Continue current diet, Start ONS  Continued Inpatient Monitoring, Speech Therapy, Education Not Indicated, Coordination of Care    Nutrition Evaluation:   · Evaluation: Goals set   · Goals: Consume 50%+ diet/ONS without aspiration    · Monitoring: Meal Intake, Supplement Intake, Diet Tolerance, Skin Integrity, Wound Healing, Fluid Balance, Mental Status/Confusion, Weight, Comparative Standards, Pertinent Labs, Chewing/Swallowing    See Adult Nutrition Doc Flowsheet for more detail.      Electronically signed by Tarah Harrison RD, LD on 9/10/18 at 1:21 PM    Contact Number: [id number]

## 2018-09-10 NOTE — PROGRESS NOTES
HPI:  Surjit Benjamin is much more awake and alert during my examination today. He is able to answer questions and provide some insight into his past medical history. He states that he lives at home with his son. He has been having difficulty completing activities of daily living. He was working with therapy teams during my examination and it is obvious she will require rehabilitation upon discharge. Patient voiced no new complaints since hospital admission. Questions, answers, and tests reviewed. ROS:  Cardiovascular:   Denies any chest pain, irregular heartbeats, or palpitations. Respiratory:   Denies shortness of breath, coughing, sputum production, hemoptysis, or wheezing. Gastrointestinal:   Denies nausea, vomiting, diarrhea, or constipation. Denies any abdominal pain. Extremities:   Denies any lower extremity swelling or edema. Neurology:    Denies any headache or focal neurological deficits. Admits to generalized weakness and deconditioning. Derm:    Denies any rashes, ulcers, or excoriations. Denies bruising. Genitourinary:    Denies any urgency, frequency, hematuria. Voiding with the use of a Huerta catheter. Physical Exam:    Vitals:    09/10/18 0745   BP: (!) 121/59   Pulse: 58   Resp: 16   Temp: 98.1 °F (36.7 °C)   SpO2: 98%       HEENT:  PERRLA. EOMI. Sclera clear. Buccal mucosa moist. Nasal cannula oxygen is in place. Neck:  Supple. Trachea midline. No thyromegaly. No JVD. No bruits. Heart:  Rhythm regular, rate controlled. Systolic murmur. Lungs:  Symmetrical. Clear to auscultation bilaterally. No wheezes. No rhonchi. No rales. Abdomen: Soft. Non-tender. Non-distended. Bowel sounds positive. No organomegaly or masses. No pain on palpation    Extremities:  Peripheral pulses present. No peripheral edema. No ulcers. Neurologic:  Alert x 3. No focal deficit. Cranial nerves grossly intact. Skin:  No petechia. No hemorrhage. No wounds.     CBC with Differential:    Lab Results   Component Value Date    WBC 7.7 09/10/2018    RBC 3.16 09/10/2018    HGB 9.7 09/10/2018    HCT 30.4 09/10/2018     09/10/2018    MCV 96.2 09/10/2018    MCH 30.7 09/10/2018    MCHC 31.9 09/10/2018    RDW 13.2 09/10/2018    SEGSPCT 82 12/08/2013    LYMPHOPCT 3.5 09/07/2018    MONOPCT 3.5 09/07/2018    BASOPCT 0.2 09/07/2018    MONOSABS 0.35 09/07/2018    LYMPHSABS 0.35 09/07/2018    EOSABS 0.00 09/07/2018    BASOSABS 0.00 09/07/2018     BMP:    Lab Results   Component Value Date     09/10/2018    K 3.2 09/10/2018    K 4.5 07/17/2018     09/10/2018    CO2 26 09/10/2018    BUN 11 09/10/2018    LABALBU 3.3 09/07/2018    LABALBU 3.4 08/05/2011    CREATININE 0.8 09/10/2018    CALCIUM 7.7 09/10/2018    GFRAA >60 09/10/2018    LABGLOM >60 09/10/2018    GLUCOSE 108 09/10/2018    GLUCOSE 83 08/06/2011       Other Data:      Intake/Output Summary (Last 24 hours) at 09/10/18 1042  Last data filed at 09/10/18 2639   Gross per 24 hour   Intake             2545 ml   Output                0 ml   Net             2545 ml         Scheduled Medications:   potassium chloride  40 mEq Oral Once    cefTRIAXone (ROCEPHIN) IV  1 g Intravenous Q24H    thiamine  100 mg Oral Daily    folic acid  1 mg Oral Daily    clopidogrel  75 mg Oral Daily    sodium chloride flush  10 mL Intravenous 2 times per day         Infusion Medications:   dextrose      dextrose 5 % and 0.45 % NaCl 100 mL/hr at 09/10/18 0529       Assessment:   1. Acute metabolic encephalopathy with underlying urinary tract infection  2. Failure to thrive with severe protein calorie malnutrition and inability to complete activities of daily living  3. History of bradycardia  4. Mild pulmonary hypertension  5. Anemia of chronic disease  6. Right wrist drop    Plan:   The patient's confusion has entirely resolved at this point. He remains profoundly weak and deconditioned and will ultimately require rehabilitation upon discharge.  Social work is

## 2018-09-10 NOTE — PLAN OF CARE
Problem: Nutrition  Goal: Optimal nutrition therapy  Outcome: Ongoing  Nutrition Problem: Severe malnutrition, in context of acute illness or injury  Intervention: Food and/or Nutrient Delivery: Continue current diet, Start ONS  Nutritional Goals: Consume 50%+ diet/ONS without aspiration

## 2018-09-10 NOTE — PROGRESS NOTES
Speech Language Pathology      NAME:  Jessica Bennett  :  1940  DATE: 9/10/2018  ROOM:  8649/[id number]    Patient Active Problem List   Diagnosis    Syncope and collapse    Right wrist drop    Severe protein-calorie malnutrition (HCC)    Rhabdomyolysis    Acute metabolic encephalopathy       Patient seen for swallow therapy 15 minutes. Patient tolerated puree and honey thick well. No cough or change in vocal quality noted.   Will continue POC    Trevin Williamson MSCCC/SLP  Speech Language Pathologist  [phone]

## 2018-09-10 NOTE — CARE COORDINATION
Ss note:9/10/2018.11:42 AM consult noted, pt currently has telesitter. ANGELA attempted to reach pt son Harvey Owen [phone] however mailbox is full; unable to leave message, (other number listed is no longer in service.) Angela spoke with Marcin Navas pt ex wife as she is listed on pt facesheet, she relays there is no other numbers to reach son at but she will attempt to reach him to assist with d/c planning. Pt has hx of [address], Will await return call from family for d/c plan, (pt is medicare, no precert required for placement however if snf is the plan the 3rd day is tomorrow.) Awaiting return call from son.  Harrold Curling, LSW

## 2018-09-10 NOTE — PROGRESS NOTES
A: Pt was unable to progress and take steps d/t BLE weakness and fear of falling. Pt required increased time to complete all functional activity d/t fearful of falling and easily distracted. Pt required encouragement and cues throughout. Pt is at risk of falls. P: Continue with physical therapy   Laura Martin, PTA    GOALS to be met in 3 days. Bed mobility- Minimal assist                                                Transfers-Sit to stand- Minimal assist                 Gait: Patient to ambulate 20 feet using Wheeled Walker with Minimal assist                   Increase rom in affected joints by 10%  Increase strength in affected mm groups by 1/3 grade  Increase balance to allow for improvement towards functional goals. Increase endurance to allow for improvement towards functional goals.

## 2018-09-10 NOTE — PROGRESS NOTES
SPEECH LANGUAGE PATHOLOGY     VIDEOFLUOROSCOPIC STUDY OF SWALLOWING (MBSS)  NAME:  Narendra Maldonado  :  1940  ROOM:  [id number] DATE: 9/10/2018    DYSPHAGIA DIAGNOSIS:  Mild to moderate pharyngeal dysphagia   DIET RECOMMENDATIONS:  Puree consistency solids (NDD Level 1) and nectar consistency liquids  THERAPY RECOMMENDATIONS:    [] Repeat swallow study in    [x] Therapy is recommended to:      [x] Improve oral motor strength and range of motion    [x] Improve tongue base retraction    [x] Improve laryngeal strength and range of motion   [] Provide Deep Pharyngeal Neuromuscular Stimulation (DPNS)   [] Address cricopharyngeal dysfunction (Shaker Exercises)    [x]  Provide ongoing meal endurance analysis to monitor pt's tolerance for prescribed diet and provide safe swallowing compensatory strategies as appropriate.      []Other:    [] Therapy is not recommended   FEEDING RECOMMENDATIONS: (check all that apply)  Supervision with              PO intake  x         Eat in upright position          x  Small bites/sips from cup    x        Alternate solids and liquids  x             Check for oral pocketing             Place food on left side           of tongue Place food on right side of tongue No straw   x            Spoon sip liquids              Liquids via cup              administration   x    Double swallow           x Multiple swallow              Throat clear/swallow         Effortful swallow            D[place] water protocol             Modified [place] water             protocol   MEDICATION ADMINISTRATION:  Administer medication as per patient preference            Administer medication whole,   one at a time, with water                 Administer medication whole/crushed in applesauce/pudding  x          Administer medication via   IV/NG/PEG tube                 ______________________________________________________________  OBJECTIVE ASSESSMENT:  Patient positioning: Seated 70-90°  Viewing Plane(s): LATERAL ONLY  Contrast: commercially prepared, non-standardized barium viscosities; graduated from thin liquid to pudding consistency. Administered via tsp (5 ml boluses), by cup or straw in single and sequentially swallowed boluses as tolerated. Solid evaluated with 1/2 shortbread cookie/3 ml barium pudding coated as tolerated. MBSImP Results:   Lip closure for intraoral bolus containment resulted in no labial escape. Tongue control during bolus hold resulted in posterior escape of less then 1/2 of the bolus. Bolus preparation and mastication patient spit cookie out did not cognitively recognize item and did not attempt to chew it. . Bolus transport/lingual motion was with slow tongue motion. Oral residue was a trace collection which cleared with a second swallow     Initiation of the pharyngeal swallow occurred as the bolus head reached the pyriform sinuses. Soft palate elevation resulted in no bolus between the soft palate and the pharyngeal wall. Laryngeal elevation demonstrated partial superior movement of the thyroid cartilage with partial approximation of the arytenoids to the epiglottic petiole. Anterior hyoid excursion demonstrated partial anterior movement. Epiglottic movement resulted in partial inversion. Laryngeal vestibular closure was incomplete, as indicated by a narrow column of air or contrast within the laryngeal vestibule at the height of the swallow. Pharyngeal stripping wave was present but diminished. Pharyngeal contraction could not be determined due to logistical reasons not related to physiologic impairment. Pharyngoesophageal segment opening was completely distended for complete duration with no obstruction of bolus flow. Tongue base retraction allowed a collection of residue between the retracted tongue base and the posterior pharyngeal wall.  A collection of residue remained within or on the pharyngeal structures Esophageal clearance in the upright position could not be

## 2018-09-11 VITALS
HEART RATE: 84 BPM | OXYGEN SATURATION: 98 % | SYSTOLIC BLOOD PRESSURE: 100 MMHG | RESPIRATION RATE: 16 BRPM | TEMPERATURE: 98 F | BODY MASS INDEX: 16.03 KG/M2 | WEIGHT: 112 LBS | DIASTOLIC BLOOD PRESSURE: 49 MMHG | HEIGHT: 70 IN

## 2018-09-11 LAB
ANION GAP SERPL CALCULATED.3IONS-SCNC: 7 MMOL/L (ref 7–16)
BUN BLDV-MCNC: 8 MG/DL (ref 8–23)
CALCIUM SERPL-MCNC: 7.6 MG/DL (ref 8.6–10.2)
CHLORIDE BLD-SCNC: 101 MMOL/L (ref 98–107)
CO2: 29 MMOL/L (ref 22–29)
CREAT SERPL-MCNC: 0.7 MG/DL (ref 0.7–1.2)
GFR AFRICAN AMERICAN: >60
GFR NON-AFRICAN AMERICAN: >60 ML/MIN/1.73
GLUCOSE BLD-MCNC: 116 MG/DL (ref 74–109)
HCT VFR BLD CALC: 31.7 % (ref 37–54)
HEMOGLOBIN: 10.2 G/DL (ref 12.5–16.5)
MCH RBC QN AUTO: 29.9 PG (ref 26–35)
MCHC RBC AUTO-ENTMCNC: 32.2 % (ref 32–34.5)
MCV RBC AUTO: 93 FL (ref 80–99.9)
PDW BLD-RTO: 12.9 FL (ref 11.5–15)
PLATELET # BLD: 303 E9/L (ref 130–450)
PMV BLD AUTO: 10.9 FL (ref 7–12)
POTASSIUM SERPL-SCNC: 3.3 MMOL/L (ref 3.5–5)
RBC # BLD: 3.41 E12/L (ref 3.8–5.8)
SODIUM BLD-SCNC: 137 MMOL/L (ref 132–146)
WBC # BLD: 6.5 E9/L (ref 4.5–11.5)

## 2018-09-11 PROCEDURE — 2580000003 HC RX 258: Performed by: INTERNAL MEDICINE

## 2018-09-11 PROCEDURE — 92526 ORAL FUNCTION THERAPY: CPT | Performed by: SPEECH-LANGUAGE PATHOLOGIST

## 2018-09-11 PROCEDURE — 6370000000 HC RX 637 (ALT 250 FOR IP): Performed by: INTERNAL MEDICINE

## 2018-09-11 PROCEDURE — 36415 COLL VENOUS BLD VENIPUNCTURE: CPT

## 2018-09-11 PROCEDURE — 85027 COMPLETE CBC AUTOMATED: CPT

## 2018-09-11 PROCEDURE — 80048 BASIC METABOLIC PNL TOTAL CA: CPT

## 2018-09-11 PROCEDURE — 6360000002 HC RX W HCPCS: Performed by: INTERNAL MEDICINE

## 2018-09-11 RX ORDER — LANOLIN ALCOHOL/MO/W.PET/CERES
100 CREAM (GRAM) TOPICAL DAILY
Qty: 30 TABLET | Refills: 3 | Status: SHIPPED | OUTPATIENT
Start: 2018-09-12

## 2018-09-11 RX ADMIN — CLOPIDOGREL BISULFATE 75 MG: 75 TABLET ORAL at 09:06

## 2018-09-11 RX ADMIN — DEXTROSE AND SODIUM CHLORIDE: 5; 450 INJECTION, SOLUTION INTRAVENOUS at 01:53

## 2018-09-11 RX ADMIN — FOLIC ACID 1 MG: 1 TABLET ORAL at 09:06

## 2018-09-11 RX ADMIN — CEFTRIAXONE 1 G: 1 INJECTION, POWDER, FOR SOLUTION INTRAMUSCULAR; INTRAVENOUS at 09:06

## 2018-09-11 RX ADMIN — Medication 100 MG: at 09:06

## 2018-09-11 ASSESSMENT — PAIN SCALES - GENERAL: PAINLEVEL_OUTOF10: 0

## 2018-09-11 NOTE — DISCHARGE SUMMARY
Component Value Date    TRIG 69 07/18/2018     Lab Results   Component Value Date    HDL 45 07/18/2018     Lab Results   Component Value Date    LDLCALC 38 07/18/2018     Lab Results   Component Value Date    LABA1C 5.5 07/18/2018       IMAGING:  Ct Head Wo Contrast    Result Date: 9/7/2018  Location:200 Exam: CT HEAD WO CONTRAST Comparison: 7/24/2018 History: Altered mental status Findings: Noncontrast images were obtained through the brain parenchyma. Automated dose control. The ventricles are midline and nondilated. No acute abnormality of the brain parenchyma, intracranial hemorrhage, mass effect or large extra-axial fluid collection. Cortical atrophy and bifrontal encephalomalacia     No CT evidence of acute intracranial abnormality. Xr Chest Portable    Result Date: 9/10/2018  Location:200 Exam: XR CHEST PORTABLE Comparison: 9/7/2018 History: Dyspnea Findings: Portable AP upright chest. Heart size is normal. Pulmonary vessels are nondistended. No focal pulmonary parenchymal consolidation. No acute cardiopulmonary disease. Xr Chest Portable    Result Date: 9/7/2018  Location:200 Exam: XR CHEST PORTABLE Comparison: 7/17/2018 History: Altered mental status Findings: Portable AP upright chest. Heart size is normal. Pulmonary vessels are nondistended. No focal pulmonary parenchymal consolidation. No acute cardiopulmonary disease. Fluoroscopy Modified Barium Swallow With Video    Result Date: 9/10/2018  Reading location: 200 INDICATION: Dysphagia FINDINGS: Video swallowing study in conjunction with the speech pathologist. Fluoroscopic time 0.9 minutes. Mildly delayed oral phase. No structural abnormality. Moderate stasis in the vallecula and piriform sinuses. No obstruction and no aspiration. Episodic laryngeal penetration. Abnormal study. Refer to speech pathologist for recommendations.     Fl Modified Barium Swallow W Video    Result Date: 9/8/2018  Location:200 Exam: FL MODIFIED BARIUM SWALLOW W VIDEO Indications: Cough. Evaluate for aspiration. TECHNIQUE: The study was performed in conjunction with the speech pathology and rehabilitation service. The patient swallowed thin and honey and nectar consistency liquid barium and barium coated pudding. Patient could not swallow a barium coated cookie. FINDINGS: 1.1 minute of fluoroscopy time was utilized. There was aspiration during swallowing of the thin and nectar consistency liquid barium. No penetration or aspiration was noted during or after swallowing the honey consistency liquid barium or the barium coated pudding. There was residual within the vallecula and piriform sinuses after swallowing the honey consistency liquid barium and the barium coated pudding. The amount of residual was large after swallowing the barium coated pudding. 1. Aspiration with thin and nectar consistency liquid barium. 2. Residual within vallecula and perform sinuses after swallowing honey consistency liquid barium and barium coated pudding. The amount of residual was large with the barium coated pudding        HOSPITAL COURSE:   Cesar Walls did very well throughout the hospital stay. He worked with the therapy teams including the speech therapy team. Lab work and vital signs essentially returned to his baseline. It was determined that he will require increasing therapy upon discharge and family ultimately agreed to [facility]. This was arranged accordingly and the patient was deemed acceptable for discharge. BRIEF PHYSICAL EXAMINATION AND LABORATORIES ON DAY OF DISCHARGE:  VITALS:  /60   Pulse 72   Temp 98.9 °F (37.2 °C) (Oral)   Resp 17   Ht 5' 10\" (1.778 m)   Wt 112 lb (50.8 kg)   SpO2 100%   BMI 16.07 kg/m²     HEENT:  PERRLA. EOMI. Sclera clear. Buccal mucosa moist.    Neck:  Supple. Trachea midline. No thyromegaly. No JVD. No bruits. Heart:  Rhythm regular, rate controlled. No murmurs.     Lungs:  Symmetrical. Clear to auscultation bilaterally. No wheezes. No rhonchi. No rales. Abdomen: Soft. Non-tender. Non-distended. Bowel sounds positive. No organomegaly or masses. No pain on palpation    Extremities:  Peripheral pulses present. No peripheral edema. No ulcers. Neurologic:  Alert x 3. No focal deficit. Cranial nerves grossly intact. Skin:  No petechia. No hemorrhage. No wounds. DISPOSITION:  The patient's condition is good. At this time the patient is without objective evidence of an acute process requiring continuing hospitalization or inpatient management. They are stable for discharge with outpatient follow-up. I have spoken with the patient and discussed the results of the current hospitalization, in addition to providing specific details for the plan of care and counseling regarding the diagnosis and prognosis. The plan has been discussed in detail and they are aware of the specific conditions for emergent return, as well as the importance of follow-up. Their questions are answered at this time and they are agreeable with the plan for discharge to [facility]. DISCHARGE MEDICATIONS:    Hannah Toby   Home Medication Instructions AMIE:[id number]    Printed on:09/11/18 2074   Medication Information                      clopidogrel (PLAVIX) 75 MG tablet  Take 1 tablet by mouth daily             folic acid (FOLVITE) 1 MG tablet  Take 1 tablet by mouth daily             vitamin B-1 100 MG tablet  Take 1 tablet by mouth daily                 FOLLOW UP/INSTRUCTIONS:  · This patient is instructed to follow-up with his primary care physician. · Patient is instructed to follow-up with the consults listed above as directed by them. · he is instructed to resume home medications and take new medications as indicated in the list above. · If the patient has a recurrence of symptoms, he is instructed to go to the ED.     Preparing for this patient's discharge, including paperwork, orders, instructions, and meeting

## 2018-09-13 LAB
BLOOD CULTURE, ROUTINE: NORMAL
CULTURE, BLOOD 2: NORMAL

## 2018-09-14 LAB
EKG ATRIAL RATE: 74 BPM
EKG P AXIS: 106 DEGREES
EKG P-R INTERVAL: 126 MS
EKG Q-T INTERVAL: 428 MS
EKG QRS DURATION: 86 MS
EKG QTC CALCULATION (BAZETT): 475 MS
EKG R AXIS: 59 DEGREES
EKG T AXIS: 15 DEGREES
EKG VENTRICULAR RATE: 74 BPM

## 2019-02-11 NOTE — PROGRESS NOTES
Attempted assessment; patient unable to remain awake. Will return at next available opportunity. Review of Systems:  	•	CONSTITUTIONAL - no fever, no diaphoresis, no chills  	•	SKIN - no rash  	•	HEMATOLOGIC - no bleeding, no bruising  	•	EYES - no eye pain, no blurry vision  	•	ENT - no change in hearing, no sore throat, + left ear pain and swelling , no tinnitus  	•	RESPIRATORY - no shortness of breath, no cough  	•	CARDIAC - no chest pain, no palpitations  	•	GI - no abd pain, no nausea, no vomiting, no diarrhea, no constipation  	•	GENITO-URINARY - no discharge, no dysuria; no hematuria, no increased urinary frequency  	•	MUSCULOSKELETAL - no joint paint, no swelling, no redness